# Patient Record
Sex: FEMALE | Race: WHITE | Employment: OTHER | URBAN - METROPOLITAN AREA
[De-identification: names, ages, dates, MRNs, and addresses within clinical notes are randomized per-mention and may not be internally consistent; named-entity substitution may affect disease eponyms.]

---

## 2017-01-04 ENCOUNTER — TELEPHONE (OUTPATIENT)
Dept: SURGERY | Age: 72
End: 2017-01-04

## 2017-01-04 NOTE — TELEPHONE ENCOUNTER
I called patient and told her, per Dr. Maynor Boyd, since Dr. Maynor Boyd had never seen her she would need an office appointment. I offered her tomorrow or Friday (as a push appointment) but neither of those days worked for her schedule. I gave her our number, and she said she would call back to make an appt. on Monday or Wed. Dr. Riccardo Tony normal office hours.  She states she is having arm problems and needs to get that straight first.

## 2017-01-04 NOTE — TELEPHONE ENCOUNTER
----- Message from Clinton Montenegro MD sent at 1/3/2017  4:19 PM EST -----  Regarding: FW: Unusual request by patient  Let's call her Wednesday and see what we can work out. Please remind me  Cassandra Carroll  ----- Message -----     From: Ugo Shelton MD     Sent: 1/2/2017   6:22 PM       To: Clinton Montenegro MD  Subject: Unusual request by patient                           Ender Acosta Dr. Rick Viveros,    This patient of mine needs right thyroid lobectomy and  she wants to meet with you directly on the day of surgery. She says she is okay without having to meet you/surgeon prior  She is cleared by pulmonary as she has pulmonary problems     Do you want to call her or have your nurse call her reg a consultation prior to surgery.       Dr. Peola Phoenix

## 2017-07-18 ENCOUNTER — OFFICE VISIT (OUTPATIENT)
Dept: SURGERY | Age: 72
End: 2017-07-18

## 2017-07-18 VITALS
HEART RATE: 81 BPM | HEIGHT: 67 IN | RESPIRATION RATE: 18 BRPM | BODY MASS INDEX: 21.5 KG/M2 | TEMPERATURE: 98.2 F | WEIGHT: 137 LBS | SYSTOLIC BLOOD PRESSURE: 126 MMHG | OXYGEN SATURATION: 93 % | DIASTOLIC BLOOD PRESSURE: 88 MMHG

## 2017-07-18 DIAGNOSIS — E04.2 MULTINODULAR GOITER: Primary | ICD-10-CM

## 2017-07-18 NOTE — MR AVS SNAPSHOT
Visit Information Date & Time Provider Department Dept. Phone Encounter #  
 7/18/2017  1:00 PM Tuan Hamilton MD [phone] [id number] Your Appointments 8/21/2017  9:15 AM  
POST OP 10 MIN with JOSEPH Hargrove  
[facility] 22 857 ([address]) Appt Note: 8/4/17DC(EB assist)Right Thyroid Lobectomy, Possible Total.po  
 [address] [facility] [address]  
[address] [place] [address] Upcoming Health Maintenance Date Due Hepatitis C Screening 1945 DTaP/Tdap/Td series (1 - Tdap) 5/27/1967 FOBT Q 1 YEAR AGE 50-75 5/27/1996 ZOSTER VACCINE AGE 60> 5/27/2006 GLAUCOMA SCREENING Q2Y 5/27/2011 MEDICARE YEARLY EXAM 5/27/2011 INFLUENZA AGE 9 TO ADULT 8/1/2017 Pneumococcal 65+ Low/Medium Risk (2 of 2 - PPSV23) 9/16/2017 BREAST CANCER SCRN MAMMOGRAM 7/29/2018 Allergies as of 7/18/2017  Review Complete On: 7/18/2017 By: Tuan Hamilton MD  
  
 Severity Noted Reaction Type Reactions Aspirin  07/18/2017    Nausea Only Ciprofloxacin  07/18/2017    Nausea and Vomiting Codeine  10/03/2016    Itching Ibuprofen  07/18/2017    Nausea and Vomiting Levaquin [Levofloxacin]  07/18/2017    Nausea and Vomiting Macrobid [Nitrofurantoin Monohyd/m-cryst]  07/18/2017    Nausea and Vomiting Morphine  10/03/2016    Nausea and Vomiting Current Immunizations  Never Reviewed No immunizations on file. Not reviewed this visit Vitals BP Pulse Temp Resp Height(growth percentile) Weight(growth percentile) 126/88 81 98.2 °F (36.8 °C) (Oral) 18 5' 7\" (1.702 m) 137 lb (62.1 kg) SpO2 BMI OB Status Smoking Status 93% 21.46 kg/m2 Postmenopausal Former Smoker Vitals History BMI and BSA Data Body Mass Index Body Surface Area  
 21.46 kg/m 2 1.71 m 2 Preferred Pharmacy Pharmacy Name Phone [facility] [address], [address] [facility] [place] AT [address] [phone] Your Updated Medication List  
  
   
This list is accurate as of: 7/18/17  4:42 PM.  Always use your most recent med list.  
  
  
  
  
 albuterol-ipratropium 2.5 mg-0.5 mg/3 ml Nebu Commonly known as:  Mario Cast INHALE THE CONTENTS OF 1 VIAL VIA NEB QID PRN  
  
 ALPRAZolam 0.25 mg tablet Commonly known as:  Darus Edu Take 1 Tab by mouth as needed. ASMANEX TWISTHALER 220 mcg (60 doses) inhaler Generic drug:  mometasone INL 1 PUFF PO BID  
  
 ATROVENT HFA 17 mcg/actuation inhaler Generic drug:  ipratropium INL 2 PFS PO Q 4 H  
  
 azithromycin 250 mg tablet Commonly known as:  Berniece Seat Take 1 Tab by mouth daily. DULERA 200-5 mcg/actuation HFA inhaler Generic drug:  mometasone-formoterol INL 2 PFS PO BID  
  
 ergocalciferol 50,000 unit capsule Commonly known as:  ERGOCALCIFEROL Take 1 Cap by mouth every seven (7) days. fluticasone 50 mcg/actuation nasal spray Commonly known as:  Keila Ang SHAKE WELL AND INSTILL 2 SPRAYS IEN D  
  
 FORADIL AEROLIZER 12 mcg capsule for inhaler Generic drug:  formoterol INHALE THE CONTENTS OF 1 CAPSULE USING DEVICE Q 12 H  
  
 furosemide 40 mg tablet Commonly known as:  LASIX Take 1 Tab by mouth as needed. gabapentin 100 mg capsule Commonly known as:  NEURONTIN Take 1 Cap by mouth three (3) times daily as needed. Insulin Needles (Disposable) 31 gauge x 3/16\" Ndle Commonly known as:  BD INSULIN PEN NEEDLE UF MINI Use once daily. Dx code M81.0  
  
 levothyroxine 25 mcg tablet Commonly known as:  SYNTHROID Take 1 Tab by mouth Daily (before breakfast). montelukast 10 mg tablet Commonly known as:  SINGULAIR TK 1 T PO BED  
  
 nystatin 100,000 unit/mL suspension Commonly known as:  MYCOSTATIN  
SHAKE WELL AND TK 5 ML PO QID FOR 7 DAYS predniSONE 20 mg tablet Commonly known as:  DELTASONE  
TK 1 T PO D FOR 7 DAYS  FILL PRN  
  
 teriparatide 20 mcg/dose - 600 mcg/2.4 mL Pnij injection Commonly known as:  FORTEO  
0.08 mL by SubCUTAneous route daily. * VENTOLIN HFA 90 mcg/actuation inhaler Generic drug:  albuterol INHALE 2 PUFFS Q 4 H PRN FOR WHEEZING  
  
 * albuterol 2.5 mg /3 mL (0.083 %) nebulizer solution Commonly known as:  PROVENTIL VENTOLIN  
by Nebulization route every four (4) hours as needed. * Notice: This list has 2 medication(s) that are the same as other medications prescribed for you. Read the directions carefully, and ask your doctor or other care provider to review them with you. To-Do List   
 07/28/2017 10:30 AM  
  Appointment with [address] [place] [address] PAT EXAM RM 2 at [address] [place] [address] ([phone]) Introducing \[facility]\"" & [facility] SERVICES! [facility] introduces Image Socket patient portal. Now you can access parts of your medical record, email your doctor's office, and request medication refills online. 1. In your internet browser, go to [email or web address] 2. Click on the First Time User? Click Here link in the Sign In box. You will see the New Member Sign Up page. 3. Enter your Frontier T[employer] Access Code exactly as it appears below. You will not need to use this code after youve completed the sign-up process. If you do not sign up before the expiration date, you must request a new code. · Image Socket Access Code: [email or web address] Expires: 10/16/2017  4:41 PM 
 
4. Enter the last four digits of your Social Security Number (xxxx) and Date of Birth (mm/dd/yyyy) as indicated and click Submit. You will be taken to the next sign-up page. 5. Create a Frontier Toxicologyt ID. This will be your Frontier T[employer] login ID and cannot be changed, so think of one that is secure and easy to remember. 6. Create a [employer] T[employer]t password. You can change your password at any time. 7. Enter your Password Reset Question and Answer. This can be used at a later time if you forget your password. 8. Enter your e-mail address. You will receive e-mail notification when new information is available in [address] 9. Click Sign Up. You can now view and download portions of your medical record. 10. Click the Download Summary menu link to download a portable copy of your medical information. If you have questions, please visit the Frequently Asked Questions section of the Sogou website. Remember, Sogou is NOT to be used for urgent needs. For medical emergencies, dial 911. Now available from your iPhone and Android! Please provide this summary of care documentation to your next provider. Your primary care clinician is listed as Cheikh Bhatti. If you have any questions after today's visit, please call [phone].

## 2017-07-18 NOTE — PROGRESS NOTES
Chief Complaint   Patient presents with    Thyroid Problem     nodules on thyroids, evaul for possible surg. 1. Have you been to the ER, urgent care clinic since your last visit? Hospitalized since your last visit? no    2. Have you seen or consulted any other health care providers outside of the [address] [place] [address] since your last visit? Include any pap smears or colon screening.  Yes PCP Johnny Millan pulmonologist

## 2017-07-18 NOTE — PROGRESS NOTES
HISTORY OF PRESENT ILLNESS  Dani Allen is a 71 y.o. female who is referred by Dr. Cooper Olivares for right thyroid lobectomy. HPI Comments: Ms. Roderick Venegas tells me that she was found to have a nodule in the right lobe of her thyroid in 8/2016. The nodule measured 2.4cm x 1.6cm x 1.7cm on ultrasound. (By report. Films not available.) Subsequent FNA was non diagnostic. Since then, she has been experiencing increased dysphagia and odynophagia. She has otherwise been in her usual state of health. Past Medical History:  No date: Back pain  No date: COPD (chronic obstructive pulmonary disease) (*  No date: Diverticulitis  No date: Dizziness  No date: Gastritis  No date: Hepatitis B  No date: Hernia  7/18/2017: Multinodular goiter  No date: Rheumatic fever    Past Surgical History:  No date: HX APPENDECTOMY  No date: HX CHOLECYSTECTOMY  No date: HX HYSTERECTOMY  No date: HX TONSIL AND ADENOIDECTOMY    Review of patient's family history indicates:    Heart Disease                  Mother                    COPD                           Sister                    Social History: Employment - Retired. Tobacco - Denies. EtOH - Denies. Review of systems negative except as noted. Review of Systems   HENT: Positive for ear pain. Dysphagia. Odynophagia. Voice is \"raspy. \"   Eyes: Positive for blurred vision. Respiratory: Positive for cough. Gastrointestinal: Positive for diarrhea. Musculoskeletal: Positive for back pain and joint pain. Joint swelling. Stiff joints. Neurological: Positive for dizziness and focal weakness. Physical Exam   Constitutional: She appears well-developed and well-nourished. No distress. HENT:   Head: Normocephalic and atraumatic. Eyes: No scleral icterus. Neck: Neck supple. Thyromegaly present. Cardiovascular: Normal rate and regular rhythm. Pulmonary/Chest: Effort normal and breath sounds normal.   Abdominal: Soft. She exhibits no distension.  There is no tenderness. There is no rebound and no guarding. Musculoskeletal: Normal range of motion. Lymphadenopathy:     She has no cervical adenopathy. Neurological: She is alert. Vitals reviewed. ASSESSMENT and PLAN  In view of the findings on H and P and ultrasound, Ms. Nayeli Leija should benefit from right thyroid lobectomy. Discussed procedure with her including risks of bleeding, infection, hypoparathyroidism and recurrent laryngeal nerve injury. Will send right thyroid lobectomy for frozen section evaluation. If malignant, then will plan on completion thyroidectomy with attendant risks of recurrent laryngeal nerve injury and hypoparathyroidism. Also explained to Ms. Nayeli Leija that it is possible that the frozen section is negative but that permanent sections will be positive for malignancy and that she will require completion thyroidectomy. She understands and wishes to proceed. I have tentatively scheduled Ms. Nayeli Leija for surgery on August 4, 2017 at [facility] and will see her back in the office postoperatively. She is agreeable to this plan of action and is most certainly free to contact the office should any questions or concerns arise.        CC: MD Iona Medel MD

## 2017-07-21 RX ORDER — BUPIVACAINE HYDROCHLORIDE 2.5 MG/ML
30 INJECTION, SOLUTION EPIDURAL; INFILTRATION; INTRACAUDAL ONCE
Status: CANCELLED | OUTPATIENT
Start: 2017-07-21 | End: 2017-07-21

## 2017-07-28 ENCOUNTER — HOSPITAL ENCOUNTER (OUTPATIENT)
Dept: PREADMISSION TESTING | Age: 72
Discharge: HOME | End: 2017-07-28
Payer: MEDICARE

## 2017-07-28 VITALS
HEIGHT: 65 IN | WEIGHT: 132 LBS | SYSTOLIC BLOOD PRESSURE: 128 MMHG | DIASTOLIC BLOOD PRESSURE: 80 MMHG | TEMPERATURE: 98.3 F | BODY MASS INDEX: 21.99 KG/M2 | HEART RATE: 80 BPM

## 2017-07-28 LAB
ANION GAP BLD CALC-SCNC: 8 MMOL/L (ref 5–15)
ATRIAL RATE: 66 BPM
BASOPHILS # BLD AUTO: 0.1 K/UL (ref 0–0.1)
BASOPHILS # BLD: 1 % (ref 0–1)
BUN SERPL-MCNC: 15 MG/DL (ref 6–20)
BUN/CREAT SERPL: 26 (ref 12–20)
CALCIUM SERPL-MCNC: 9 MG/DL (ref 8.5–10.1)
CALCULATED P AXIS, ECG09: 77 DEGREES
CALCULATED R AXIS, ECG10: 40 DEGREES
CALCULATED T AXIS, ECG11: 49 DEGREES
CHLORIDE SERPL-SCNC: 106 MMOL/L (ref 97–108)
CO2 SERPL-SCNC: 28 MMOL/L (ref 21–32)
CREAT SERPL-MCNC: 0.58 MG/DL (ref 0.55–1.02)
DIAGNOSIS, 93000: NORMAL
EOSINOPHIL # BLD: 0.3 K/UL (ref 0–0.4)
EOSINOPHIL NFR BLD: 3 % (ref 0–7)
ERYTHROCYTE [DISTWIDTH] IN BLOOD BY AUTOMATED COUNT: 13.2 % (ref 11.5–14.5)
GLUCOSE SERPL-MCNC: 75 MG/DL (ref 65–100)
HCT VFR BLD AUTO: 38.3 % (ref 35–47)
HGB BLD-MCNC: 12.3 G/DL (ref 11.5–16)
LYMPHOCYTES # BLD AUTO: 33 % (ref 12–49)
LYMPHOCYTES # BLD: 2.4 K/UL (ref 0.8–3.5)
MCH RBC QN AUTO: 29.1 PG (ref 26–34)
MCHC RBC AUTO-ENTMCNC: 32.1 G/DL (ref 30–36.5)
MCV RBC AUTO: 90.5 FL (ref 80–99)
MONOCYTES # BLD: 0.6 K/UL (ref 0–1)
MONOCYTES NFR BLD AUTO: 8 % (ref 5–13)
NEUTS SEG # BLD: 4.1 K/UL (ref 1.8–8)
NEUTS SEG NFR BLD AUTO: 55 % (ref 32–75)
P-R INTERVAL, ECG05: 144 MS
PLATELET # BLD AUTO: 234 K/UL (ref 150–400)
POTASSIUM SERPL-SCNC: 3.8 MMOL/L (ref 3.5–5.1)
Q-T INTERVAL, ECG07: 384 MS
QRS DURATION, ECG06: 82 MS
QTC CALCULATION (BEZET), ECG08: 402 MS
RBC # BLD AUTO: 4.23 M/UL (ref 3.8–5.2)
SODIUM SERPL-SCNC: 142 MMOL/L (ref 136–145)
VENTRICULAR RATE, ECG03: 66 BPM
WBC # BLD AUTO: 7.4 K/UL (ref 3.6–11)

## 2017-07-28 PROCEDURE — 85025 COMPLETE CBC W/AUTO DIFF WBC: CPT | Performed by: SURGERY

## 2017-07-28 PROCEDURE — 80048 BASIC METABOLIC PNL TOTAL CA: CPT | Performed by: SURGERY

## 2017-07-28 PROCEDURE — 93005 ELECTROCARDIOGRAM TRACING: CPT

## 2017-07-28 PROCEDURE — 36415 COLL VENOUS BLD VENIPUNCTURE: CPT | Performed by: SURGERY

## 2017-07-28 NOTE — PERIOP NOTES
PREOPERATIVE INSTRUCTIONS REVIEWED WITH PATIENT. PATIENT GIVEN TWO-- SIX PACKS OF CHG WIPES. INSTRUCTIONS REVIEWED ON USE OF CHG WIPES. PATIENT GIVEN SSI INFECTIONS SHEET. PATIENT WAS GIVEN THE OPPORTUNITY TO ASK QUESTIONS ON THE INFORMATION PROVIDED.

## 2017-07-31 NOTE — PERIOP NOTES
NOTE SENT TO FEDE BAILEY--DR GUERRERO RE: FAXED CXR REPORT AND OFFICE NOTES FROM PULM. DR AMEYA MUNOZ / [facility].

## 2017-08-03 ENCOUNTER — ANESTHESIA EVENT (OUTPATIENT)
Dept: SURGERY | Age: 72
End: 2017-08-03
Payer: MEDICARE

## 2017-08-04 ENCOUNTER — ANESTHESIA (OUTPATIENT)
Dept: SURGERY | Age: 72
End: 2017-08-04
Payer: MEDICARE

## 2017-08-04 ENCOUNTER — HOSPITAL ENCOUNTER (OUTPATIENT)
Age: 72
Setting detail: OBSERVATION
Discharge: HOME | End: 2017-08-05
Attending: SURGERY | Admitting: SURGERY
Payer: MEDICARE

## 2017-08-04 PROBLEM — E04.1 THYROID NODULE: Status: ACTIVE | Noted: 2017-08-04

## 2017-08-04 PROCEDURE — 74011250636 HC RX REV CODE- 250/636: Performed by: SURGERY

## 2017-08-04 PROCEDURE — 74011250637 HC RX REV CODE- 250/637: Performed by: SURGERY

## 2017-08-04 PROCEDURE — 77030008684 HC TU ET CUF COVD -B: Performed by: ANESTHESIOLOGY

## 2017-08-04 PROCEDURE — 94640 AIRWAY INHALATION TREATMENT: CPT

## 2017-08-04 PROCEDURE — 74011250636 HC RX REV CODE- 250/636: Performed by: ANESTHESIOLOGY

## 2017-08-04 PROCEDURE — 77030020782 HC GWN BAIR PAWS FLX 3M -B

## 2017-08-04 PROCEDURE — 77030026438 HC STYL ET INTUB CARD -A: Performed by: ANESTHESIOLOGY

## 2017-08-04 PROCEDURE — 77030018846 HC SOL IRR STRL H20 ICUM -A: Performed by: SURGERY

## 2017-08-04 PROCEDURE — 99218 HC RM OBSERVATION: CPT

## 2017-08-04 PROCEDURE — 77030010507 HC ADH SKN DERMBND J&J -B: Performed by: SURGERY

## 2017-08-04 PROCEDURE — 77030019908 HC STETH ESOPH SIMS -A: Performed by: ANESTHESIOLOGY

## 2017-08-04 PROCEDURE — 77030013079 HC BLNKT BAIR HGGR 3M -A: Performed by: ANESTHESIOLOGY

## 2017-08-04 PROCEDURE — 77030011640 HC PAD GRND REM COVD -A: Performed by: SURGERY

## 2017-08-04 PROCEDURE — 76010000131 HC OR TIME 2 TO 2.5 HR: Performed by: SURGERY

## 2017-08-04 PROCEDURE — 77030011267 HC ELECTRD BLD COVD -A: Performed by: SURGERY

## 2017-08-04 PROCEDURE — 74011000250 HC RX REV CODE- 250: Performed by: SURGERY

## 2017-08-04 PROCEDURE — 77030034626 HC LIGASURE SM JAW SEAL OPN SURG COVD -E: Performed by: SURGERY

## 2017-08-04 PROCEDURE — 74011250636 HC RX REV CODE- 250/636

## 2017-08-04 PROCEDURE — 88331 PATH CONSLTJ SURG 1 BLK 1SPC: CPT | Performed by: SURGERY

## 2017-08-04 PROCEDURE — 77030031139 HC SUT VCRL2 J&J -A: Performed by: SURGERY

## 2017-08-04 PROCEDURE — 74011000250 HC RX REV CODE- 250

## 2017-08-04 PROCEDURE — 88307 TISSUE EXAM BY PATHOLOGIST: CPT | Performed by: SURGERY

## 2017-08-04 PROCEDURE — 77030032490 HC SLV COMPR SCD KNE COVD -B: Performed by: SURGERY

## 2017-08-04 PROCEDURE — 77030010514 HC APPL CLP LIG COVD -B: Performed by: SURGERY

## 2017-08-04 PROCEDURE — 77030028700 HC BLD TISS PLSM MEDT -E: Performed by: SURGERY

## 2017-08-04 PROCEDURE — 77030002933 HC SUT MCRYL J&J -A: Performed by: SURGERY

## 2017-08-04 PROCEDURE — 76210000016 HC OR PH I REC 1 TO 1.5 HR: Performed by: SURGERY

## 2017-08-04 PROCEDURE — 77030002996 HC SUT SLK J&J -A: Performed by: SURGERY

## 2017-08-04 PROCEDURE — 76060000035 HC ANESTHESIA 2 TO 2.5 HR: Performed by: SURGERY

## 2017-08-04 PROCEDURE — 77030018836 HC SOL IRR NACL ICUM -A: Performed by: SURGERY

## 2017-08-04 RX ORDER — SODIUM CHLORIDE 9 MG/ML
25 INJECTION, SOLUTION INTRAVENOUS CONTINUOUS
Status: DISCONTINUED | OUTPATIENT
Start: 2017-08-04 | End: 2017-08-04 | Stop reason: HOSPADM

## 2017-08-04 RX ORDER — ENOXAPARIN SODIUM 100 MG/ML
40 INJECTION SUBCUTANEOUS EVERY 24 HOURS
Status: DISCONTINUED | OUTPATIENT
Start: 2017-08-04 | End: 2017-08-05 | Stop reason: HOSPADM

## 2017-08-04 RX ORDER — IPRATROPIUM BROMIDE AND ALBUTEROL SULFATE 2.5; .5 MG/3ML; MG/3ML
3 SOLUTION RESPIRATORY (INHALATION)
Status: DISCONTINUED | OUTPATIENT
Start: 2017-08-04 | End: 2017-08-05 | Stop reason: HOSPADM

## 2017-08-04 RX ORDER — ALPRAZOLAM 0.25 MG/1
0.25 TABLET ORAL
Status: DISCONTINUED | OUTPATIENT
Start: 2017-08-04 | End: 2017-08-05 | Stop reason: HOSPADM

## 2017-08-04 RX ORDER — PROCHLORPERAZINE EDISYLATE 5 MG/ML
5 INJECTION INTRAMUSCULAR; INTRAVENOUS
Status: DISCONTINUED | OUTPATIENT
Start: 2017-08-04 | End: 2017-08-04 | Stop reason: SDUPTHER

## 2017-08-04 RX ORDER — AZITHROMYCIN 250 MG/1
250 TABLET, FILM COATED ORAL DAILY
Status: DISCONTINUED | OUTPATIENT
Start: 2017-08-05 | End: 2017-08-05 | Stop reason: HOSPADM

## 2017-08-04 RX ORDER — SODIUM CHLORIDE, SODIUM LACTATE, POTASSIUM CHLORIDE, CALCIUM CHLORIDE 600; 310; 30; 20 MG/100ML; MG/100ML; MG/100ML; MG/100ML
50 INJECTION, SOLUTION INTRAVENOUS CONTINUOUS
Status: DISCONTINUED | OUTPATIENT
Start: 2017-08-04 | End: 2017-08-04

## 2017-08-04 RX ORDER — ONDANSETRON 2 MG/ML
4 INJECTION INTRAMUSCULAR; INTRAVENOUS
Status: DISCONTINUED | OUTPATIENT
Start: 2017-08-04 | End: 2017-08-05 | Stop reason: HOSPADM

## 2017-08-04 RX ORDER — BUDESONIDE 0.5 MG/2ML
500 INHALANT ORAL
Status: DISCONTINUED | OUTPATIENT
Start: 2017-08-04 | End: 2017-08-05 | Stop reason: HOSPADM

## 2017-08-04 RX ORDER — FENTANYL CITRATE 50 UG/ML
25 INJECTION, SOLUTION INTRAMUSCULAR; INTRAVENOUS
Status: COMPLETED | OUTPATIENT
Start: 2017-08-04 | End: 2017-08-04

## 2017-08-04 RX ORDER — MONTELUKAST SODIUM 10 MG/1
10 TABLET ORAL
Status: DISCONTINUED | OUTPATIENT
Start: 2017-08-04 | End: 2017-08-05 | Stop reason: HOSPADM

## 2017-08-04 RX ORDER — DIPHENHYDRAMINE HYDROCHLORIDE 50 MG/ML
12.5 INJECTION, SOLUTION INTRAMUSCULAR; INTRAVENOUS AS NEEDED
Status: DISCONTINUED | OUTPATIENT
Start: 2017-08-04 | End: 2017-08-04 | Stop reason: HOSPADM

## 2017-08-04 RX ORDER — HYDROMORPHONE HYDROCHLORIDE 1 MG/ML
0.2 INJECTION, SOLUTION INTRAMUSCULAR; INTRAVENOUS; SUBCUTANEOUS
Status: DISCONTINUED | OUTPATIENT
Start: 2017-08-04 | End: 2017-08-04 | Stop reason: HOSPADM

## 2017-08-04 RX ORDER — SODIUM CHLORIDE 0.9 % (FLUSH) 0.9 %
5-10 SYRINGE (ML) INJECTION AS NEEDED
Status: DISCONTINUED | OUTPATIENT
Start: 2017-08-04 | End: 2017-08-04 | Stop reason: HOSPADM

## 2017-08-04 RX ORDER — IPRATROPIUM BROMIDE 0.5 MG/2.5ML
500 SOLUTION RESPIRATORY (INHALATION)
Status: DISCONTINUED | OUTPATIENT
Start: 2017-08-04 | End: 2017-08-05 | Stop reason: HOSPADM

## 2017-08-04 RX ORDER — MIDAZOLAM HYDROCHLORIDE 1 MG/ML
1 INJECTION, SOLUTION INTRAMUSCULAR; INTRAVENOUS AS NEEDED
Status: DISCONTINUED | OUTPATIENT
Start: 2017-08-04 | End: 2017-08-04 | Stop reason: HOSPADM

## 2017-08-04 RX ORDER — ALBUTEROL SULFATE 0.83 MG/ML
2.5 SOLUTION RESPIRATORY (INHALATION)
Status: DISCONTINUED | OUTPATIENT
Start: 2017-08-04 | End: 2017-08-05 | Stop reason: HOSPADM

## 2017-08-04 RX ORDER — MIDAZOLAM HYDROCHLORIDE 1 MG/ML
INJECTION, SOLUTION INTRAMUSCULAR; INTRAVENOUS AS NEEDED
Status: DISCONTINUED | OUTPATIENT
Start: 2017-08-04 | End: 2017-08-04 | Stop reason: HOSPADM

## 2017-08-04 RX ORDER — ONDANSETRON 2 MG/ML
4 INJECTION INTRAMUSCULAR; INTRAVENOUS AS NEEDED
Status: DISCONTINUED | OUTPATIENT
Start: 2017-08-04 | End: 2017-08-04 | Stop reason: HOSPADM

## 2017-08-04 RX ORDER — SUCCINYLCHOLINE CHLORIDE 20 MG/ML
INJECTION INTRAMUSCULAR; INTRAVENOUS AS NEEDED
Status: DISCONTINUED | OUTPATIENT
Start: 2017-08-04 | End: 2017-08-04 | Stop reason: HOSPADM

## 2017-08-04 RX ORDER — HYDROMORPHONE HYDROCHLORIDE 1 MG/ML
INJECTION, SOLUTION INTRAMUSCULAR; INTRAVENOUS; SUBCUTANEOUS AS NEEDED
Status: DISCONTINUED | OUTPATIENT
Start: 2017-08-04 | End: 2017-08-04 | Stop reason: HOSPADM

## 2017-08-04 RX ORDER — HYDROMORPHONE HYDROCHLORIDE 1 MG/ML
0.5 INJECTION, SOLUTION INTRAMUSCULAR; INTRAVENOUS; SUBCUTANEOUS
Status: DISCONTINUED | OUTPATIENT
Start: 2017-08-04 | End: 2017-08-05 | Stop reason: HOSPADM

## 2017-08-04 RX ORDER — ONDANSETRON 2 MG/ML
INJECTION INTRAMUSCULAR; INTRAVENOUS AS NEEDED
Status: DISCONTINUED | OUTPATIENT
Start: 2017-08-04 | End: 2017-08-04 | Stop reason: HOSPADM

## 2017-08-04 RX ORDER — OXYCODONE AND ACETAMINOPHEN 5; 325 MG/1; MG/1
1 TABLET ORAL AS NEEDED
Status: DISCONTINUED | OUTPATIENT
Start: 2017-08-04 | End: 2017-08-04 | Stop reason: HOSPADM

## 2017-08-04 RX ORDER — SODIUM CHLORIDE, SODIUM LACTATE, POTASSIUM CHLORIDE, CALCIUM CHLORIDE 600; 310; 30; 20 MG/100ML; MG/100ML; MG/100ML; MG/100ML
125 INJECTION, SOLUTION INTRAVENOUS CONTINUOUS
Status: DISCONTINUED | OUTPATIENT
Start: 2017-08-04 | End: 2017-08-04 | Stop reason: HOSPADM

## 2017-08-04 RX ORDER — SODIUM CHLORIDE 0.9 % (FLUSH) 0.9 %
5-10 SYRINGE (ML) INJECTION EVERY 8 HOURS
Status: DISCONTINUED | OUTPATIENT
Start: 2017-08-04 | End: 2017-08-04 | Stop reason: HOSPADM

## 2017-08-04 RX ORDER — SODIUM CHLORIDE, SODIUM LACTATE, POTASSIUM CHLORIDE, CALCIUM CHLORIDE 600; 310; 30; 20 MG/100ML; MG/100ML; MG/100ML; MG/100ML
INJECTION, SOLUTION INTRAVENOUS
Status: DISCONTINUED | OUTPATIENT
Start: 2017-08-04 | End: 2017-08-04 | Stop reason: HOSPADM

## 2017-08-04 RX ORDER — MIDAZOLAM HYDROCHLORIDE 1 MG/ML
0.5 INJECTION, SOLUTION INTRAMUSCULAR; INTRAVENOUS
Status: DISCONTINUED | OUTPATIENT
Start: 2017-08-04 | End: 2017-08-04 | Stop reason: HOSPADM

## 2017-08-04 RX ORDER — HYDROMORPHONE HYDROCHLORIDE 5 MG/5ML
0.5 SOLUTION ORAL
Status: DISCONTINUED | OUTPATIENT
Start: 2017-08-04 | End: 2017-08-04 | Stop reason: SDUPTHER

## 2017-08-04 RX ORDER — GABAPENTIN 100 MG/1
100 CAPSULE ORAL
Status: DISCONTINUED | OUTPATIENT
Start: 2017-08-04 | End: 2017-08-05 | Stop reason: HOSPADM

## 2017-08-04 RX ORDER — FLUTICASONE PROPIONATE 50 MCG
2 SPRAY, SUSPENSION (ML) NASAL DAILY
Status: DISCONTINUED | OUTPATIENT
Start: 2017-08-05 | End: 2017-08-05 | Stop reason: HOSPADM

## 2017-08-04 RX ORDER — CEFAZOLIN SODIUM IN 0.9 % NACL 2 G/50 ML
2 INTRAVENOUS SOLUTION, PIGGYBACK (ML) INTRAVENOUS
Status: COMPLETED | OUTPATIENT
Start: 2017-08-04 | End: 2017-08-04

## 2017-08-04 RX ORDER — SODIUM CHLORIDE 0.9 % (FLUSH) 0.9 %
5-10 SYRINGE (ML) INJECTION EVERY 8 HOURS
Status: DISCONTINUED | OUTPATIENT
Start: 2017-08-04 | End: 2017-08-05 | Stop reason: HOSPADM

## 2017-08-04 RX ORDER — FUROSEMIDE 40 MG/1
40 TABLET ORAL AS NEEDED
Status: DISCONTINUED | OUTPATIENT
Start: 2017-08-04 | End: 2017-08-05 | Stop reason: HOSPADM

## 2017-08-04 RX ORDER — BUPIVACAINE HYDROCHLORIDE 2.5 MG/ML
30 INJECTION, SOLUTION EPIDURAL; INFILTRATION; INTRACAUDAL ONCE
Status: COMPLETED | OUTPATIENT
Start: 2017-08-04 | End: 2017-08-04

## 2017-08-04 RX ORDER — DIPHENHYDRAMINE HCL 25 MG
25 CAPSULE ORAL
Status: DISCONTINUED | OUTPATIENT
Start: 2017-08-04 | End: 2017-08-05 | Stop reason: HOSPADM

## 2017-08-04 RX ORDER — HYDROCODONE BITARTRATE AND ACETAMINOPHEN 5; 325 MG/1; MG/1
1 TABLET ORAL
Qty: 10 TAB | Refills: 0 | Status: SHIPPED | OUTPATIENT
Start: 2017-08-04 | End: 2019-09-18 | Stop reason: ALTCHOICE

## 2017-08-04 RX ORDER — MORPHINE SULFATE 2 MG/ML
2 INJECTION, SOLUTION INTRAMUSCULAR; INTRAVENOUS
Status: DISCONTINUED | OUTPATIENT
Start: 2017-08-04 | End: 2017-08-04 | Stop reason: ALTCHOICE

## 2017-08-04 RX ORDER — ARFORMOTEROL TARTRATE 15 UG/2ML
15 SOLUTION RESPIRATORY (INHALATION)
Status: DISCONTINUED | OUTPATIENT
Start: 2017-08-04 | End: 2017-08-05 | Stop reason: HOSPADM

## 2017-08-04 RX ORDER — ROCURONIUM BROMIDE 10 MG/ML
INJECTION, SOLUTION INTRAVENOUS AS NEEDED
Status: DISCONTINUED | OUTPATIENT
Start: 2017-08-04 | End: 2017-08-04 | Stop reason: HOSPADM

## 2017-08-04 RX ORDER — BUDESONIDE 0.5 MG/2ML
500 INHALANT ORAL
Status: DISCONTINUED | OUTPATIENT
Start: 2017-08-04 | End: 2017-08-04 | Stop reason: ALTCHOICE

## 2017-08-04 RX ORDER — SODIUM CHLORIDE 0.9 % (FLUSH) 0.9 %
5-10 SYRINGE (ML) INJECTION AS NEEDED
Status: DISCONTINUED | OUTPATIENT
Start: 2017-08-04 | End: 2017-08-05 | Stop reason: HOSPADM

## 2017-08-04 RX ORDER — ACETAMINOPHEN 325 MG/1
650 TABLET ORAL
Status: DISCONTINUED | OUTPATIENT
Start: 2017-08-04 | End: 2017-08-05 | Stop reason: HOSPADM

## 2017-08-04 RX ORDER — NYSTATIN 100000 [USP'U]/ML
500000 SUSPENSION ORAL 4 TIMES DAILY
Status: DISCONTINUED | OUTPATIENT
Start: 2017-08-04 | End: 2017-08-04

## 2017-08-04 RX ORDER — HYDROCODONE BITARTRATE AND ACETAMINOPHEN 5; 325 MG/1; MG/1
1 TABLET ORAL
Status: DISCONTINUED | OUTPATIENT
Start: 2017-08-04 | End: 2017-08-05 | Stop reason: HOSPADM

## 2017-08-04 RX ORDER — FENTANYL CITRATE 50 UG/ML
50 INJECTION, SOLUTION INTRAMUSCULAR; INTRAVENOUS AS NEEDED
Status: DISCONTINUED | OUTPATIENT
Start: 2017-08-04 | End: 2017-08-04 | Stop reason: HOSPADM

## 2017-08-04 RX ORDER — ACETAMINOPHEN 10 MG/ML
INJECTION, SOLUTION INTRAVENOUS AS NEEDED
Status: DISCONTINUED | OUTPATIENT
Start: 2017-08-04 | End: 2017-08-04 | Stop reason: HOSPADM

## 2017-08-04 RX ORDER — LEVOTHYROXINE SODIUM 25 UG/1
25 TABLET ORAL
Status: DISCONTINUED | OUTPATIENT
Start: 2017-08-05 | End: 2017-08-05 | Stop reason: HOSPADM

## 2017-08-04 RX ORDER — ALBUTEROL SULFATE 90 UG/1
2 AEROSOL, METERED RESPIRATORY (INHALATION)
Status: DISCONTINUED | OUTPATIENT
Start: 2017-08-04 | End: 2017-08-04 | Stop reason: ALTCHOICE

## 2017-08-04 RX ORDER — FENTANYL CITRATE 50 UG/ML
INJECTION, SOLUTION INTRAMUSCULAR; INTRAVENOUS AS NEEDED
Status: DISCONTINUED | OUTPATIENT
Start: 2017-08-04 | End: 2017-08-04 | Stop reason: HOSPADM

## 2017-08-04 RX ORDER — LIDOCAINE HYDROCHLORIDE 10 MG/ML
0.1 INJECTION, SOLUTION EPIDURAL; INFILTRATION; INTRACAUDAL; PERINEURAL AS NEEDED
Status: DISCONTINUED | OUTPATIENT
Start: 2017-08-04 | End: 2017-08-04 | Stop reason: HOSPADM

## 2017-08-04 RX ORDER — DEXAMETHASONE SODIUM PHOSPHATE 4 MG/ML
INJECTION, SOLUTION INTRA-ARTICULAR; INTRALESIONAL; INTRAMUSCULAR; INTRAVENOUS; SOFT TISSUE AS NEEDED
Status: DISCONTINUED | OUTPATIENT
Start: 2017-08-04 | End: 2017-08-04 | Stop reason: HOSPADM

## 2017-08-04 RX ADMIN — Medication 10 ML: at 22:00

## 2017-08-04 RX ADMIN — MONTELUKAST SODIUM 10 MG: 10 TABLET, FILM COATED ORAL at 21:32

## 2017-08-04 RX ADMIN — ONDANSETRON 4 MG: 2 INJECTION INTRAMUSCULAR; INTRAVENOUS at 08:21

## 2017-08-04 RX ADMIN — DEXAMETHASONE SODIUM PHOSPHATE 10 MG: 4 INJECTION, SOLUTION INTRA-ARTICULAR; INTRALESIONAL; INTRAMUSCULAR; INTRAVENOUS; SOFT TISSUE at 08:21

## 2017-08-04 RX ADMIN — IPRATROPIUM BROMIDE AND ALBUTEROL SULFATE 3 ML: .5; 3 SOLUTION RESPIRATORY (INHALATION) at 15:50

## 2017-08-04 RX ADMIN — SODIUM CHLORIDE, SODIUM LACTATE, POTASSIUM CHLORIDE, CALCIUM CHLORIDE: 600; 310; 30; 20 INJECTION, SOLUTION INTRAVENOUS at 07:59

## 2017-08-04 RX ADMIN — FENTANYL CITRATE 25 MCG: 50 INJECTION, SOLUTION INTRAMUSCULAR; INTRAVENOUS at 11:00

## 2017-08-04 RX ADMIN — DIPHENHYDRAMINE HYDROCHLORIDE 12.5 MG: 50 INJECTION, SOLUTION INTRAMUSCULAR; INTRAVENOUS at 10:46

## 2017-08-04 RX ADMIN — HYDROMORPHONE HYDROCHLORIDE 0.2 MG: 1 INJECTION, SOLUTION INTRAMUSCULAR; INTRAVENOUS; SUBCUTANEOUS at 10:25

## 2017-08-04 RX ADMIN — HYDROMORPHONE HYDROCHLORIDE 0.2 MG: 1 INJECTION, SOLUTION INTRAMUSCULAR; INTRAVENOUS; SUBCUTANEOUS at 08:53

## 2017-08-04 RX ADMIN — FENTANYL CITRATE 50 MCG: 50 INJECTION, SOLUTION INTRAMUSCULAR; INTRAVENOUS at 08:31

## 2017-08-04 RX ADMIN — FENTANYL CITRATE 50 MCG: 50 INJECTION, SOLUTION INTRAMUSCULAR; INTRAVENOUS at 10:25

## 2017-08-04 RX ADMIN — SODIUM CHLORIDE, SODIUM LACTATE, POTASSIUM CHLORIDE, AND CALCIUM CHLORIDE 125 ML/HR: 600; 310; 30; 20 INJECTION, SOLUTION INTRAVENOUS at 06:54

## 2017-08-04 RX ADMIN — FENTANYL CITRATE 25 MCG: 50 INJECTION, SOLUTION INTRAMUSCULAR; INTRAVENOUS at 10:40

## 2017-08-04 RX ADMIN — MIDAZOLAM HYDROCHLORIDE 2 MG: 1 INJECTION, SOLUTION INTRAMUSCULAR; INTRAVENOUS at 07:45

## 2017-08-04 RX ADMIN — BUDESONIDE 500 MCG: 0.5 INHALANT RESPIRATORY (INHALATION) at 20:38

## 2017-08-04 RX ADMIN — FENTANYL CITRATE 25 MCG: 50 INJECTION, SOLUTION INTRAMUSCULAR; INTRAVENOUS at 10:50

## 2017-08-04 RX ADMIN — FENTANYL CITRATE 25 MCG: 50 INJECTION, SOLUTION INTRAMUSCULAR; INTRAVENOUS at 10:35

## 2017-08-04 RX ADMIN — ROCURONIUM BROMIDE 10 MG: 10 INJECTION, SOLUTION INTRAVENOUS at 08:03

## 2017-08-04 RX ADMIN — HYDROMORPHONE HYDROCHLORIDE 0.5 MG: 1 INJECTION, SOLUTION INTRAMUSCULAR; INTRAVENOUS; SUBCUTANEOUS at 21:40

## 2017-08-04 RX ADMIN — ACETAMINOPHEN 1000 MG: 10 INJECTION, SOLUTION INTRAVENOUS at 08:27

## 2017-08-04 RX ADMIN — SUCCINYLCHOLINE CHLORIDE 100 MG: 20 INJECTION INTRAMUSCULAR; INTRAVENOUS at 08:03

## 2017-08-04 RX ADMIN — CEFAZOLIN 2 G: 1 INJECTION, POWDER, FOR SOLUTION INTRAMUSCULAR; INTRAVENOUS; PARENTERAL at 08:14

## 2017-08-04 RX ADMIN — IPRATROPIUM BROMIDE AND ALBUTEROL SULFATE 3 ML: .5; 3 SOLUTION RESPIRATORY (INHALATION) at 20:38

## 2017-08-04 RX ADMIN — HYDROMORPHONE HYDROCHLORIDE 0.5 MG: 1 INJECTION, SOLUTION INTRAMUSCULAR; INTRAVENOUS; SUBCUTANEOUS at 14:56

## 2017-08-04 RX ADMIN — SODIUM CHLORIDE, SODIUM LACTATE, POTASSIUM CHLORIDE, CALCIUM CHLORIDE: 600; 310; 30; 20 INJECTION, SOLUTION INTRAVENOUS at 07:54

## 2017-08-04 RX ADMIN — SODIUM CHLORIDE, SODIUM LACTATE, POTASSIUM CHLORIDE, AND CALCIUM CHLORIDE 50 ML/HR: 600; 310; 30; 20 INJECTION, SOLUTION INTRAVENOUS at 11:04

## 2017-08-04 RX ADMIN — FENTANYL CITRATE 100 MCG: 50 INJECTION, SOLUTION INTRAMUSCULAR; INTRAVENOUS at 08:03

## 2017-08-04 NOTE — IP AVS SNAPSHOT
[address] [facility] [place] 
[phone] Patient: Rupinder Cantu MRN: [id number] ERA:7/12/0273 You are allergic to the following Allergen Reactions Aspirin Nausea Only Ciprofloxacin Nausea and Vomiting Codeine Itching Ibuprofen Nausea and Vomiting Levaquin (Levofloxacin) Nausea and Vomiting Macrobid (Nitrofurantoin Monohyd/M-Cryst) Nausea and Vomiting Morphine Nausea and Vomiting Recent Documentation Height Weight BMI OB Status Smoking Status 1.651 m 59.9 kg 21.97 kg/m2 Hysterectomy Former Smoker Emergency Contacts Name Discharge Info Relation Home Work Mobile Pamella Cai DISCHARGE CAREGIVER [3] Other Relative [6] [phone] About your hospitalization You were admitted on:  August 4, 2017 You last received care in the:  [address] You were discharged on:  August 5, 2017 Unit phone number:  [phone] Why you were hospitalized Your primary diagnosis was:  Not on File Your diagnoses also included:  Thyroid Nodule Providers Seen During Your Hospitalizations Provider Role Specialty Primary office phone Erick Infante MD Attending Provider General Surgery [phone] Your Primary Care Physician (PCP) Primary Care Physician Office Phone Office Fax [facility] [phone] Follow-up Information Follow up With Details Comments Contact Info Hortencia Hartman MD   [address] [place] [address] 
[phone] Erick Infante MD On 8/21/2017 with Dr. Bola RUIZ, Khushi Patino 9:15 in [place] [id number] [facility] [address] [facility] 
[phone] Your Appointments Monday August 21, 2017  9:15 AM EDT  
POST OP 10 MIN with JOSEPH Hayward  
[facility] 22 6[address] ([address]) [address] [place] [address] [address]  
[phone] Current Discharge Medication List  
  
START taking these medications Dose & Instructions Dispensing Information Comments Morning Noon Evening Bedtime HYDROcodone-acetaminophen 5-325 mg per tablet Commonly known as:  Lawanda Roper Your last dose was: Your next dose is:    
   
   
 Dose:  1 Tab Take 1 Tab by mouth every four (4) hours as needed for Pain. Max Daily Amount: 6 Tabs. Quantity:  10 Tab Refills:  0 CONTINUE these medications which have NOT CHANGED Dose & Instructions Dispensing Information Comments Morning Noon Evening Bedtime  
 albuterol-ipratropium 2.5 mg-0.5 mg/3 ml Nebu Commonly known as:  Thor Bowles Your last dose was: Your next dose is:    
   
   
 INHALE THE CONTENTS OF 1 VIAL VIA NEB QID PRN Refills:  11 ALPRAZolam 0.25 mg tablet Commonly known as:  Pedro Grange Your last dose was: Your next dose is:    
   
   
 Dose:  1 Tab Take 1 Tab by mouth three (3) times daily as needed. Refills:  0 ASMANEX TWISTHALER 220 mcg (60 doses) inhaler Generic drug:  mometasone Your last dose was: Your next dose is: INL 1 PUFF PO BID; TAKING AS NEEDED. Refills:  3 ATROVENT HFA 17 mcg/actuation inhaler Generic drug:  ipratropium Your last dose was: Your next dose is:    
   
   
 INL 2 PFS PO Q 4 H; USING BID Refills:  11  
     
   
   
   
  
 azithromycin 250 mg tablet Commonly known as:  Marky Ship Your last dose was: Your next dose is:    
   
   
 Dose:  1 Tab Take 1 Tab by mouth daily. Refills:  3 DULERA 200-5 mcg/actuation HFA inhaler Generic drug:  mometasone-formoterol Your last dose was:     
   
Your next dose is:    
   
   
 INL 2 PFS PO BID  
 Refills:  11  
     
   
   
   
  
 fluticasone 50 mcg/actuation nasal spray Commonly known as:  Cathryn Hoot Your last dose was: Your next dose is: SHAKE WELL AND INSTILL 2 SPRAYS IEN D; AS NEEDED. Refills:  2  
     
   
   
   
  
 furosemide 40 mg tablet Commonly known as:  LASIX Your last dose was: Your next dose is:    
   
   
 Dose:  1 Tab Take 1 Tab by mouth as needed. Refills:  3  
     
   
   
   
  
 gabapentin 100 mg capsule Commonly known as:  NEURONTIN Your last dose was: Your next dose is:    
   
   
 Dose:  1 Cap Take 1 Cap by mouth three (3) times daily as needed. Refills:  3  
     
   
   
   
  
 levothyroxine 25 mcg tablet Commonly known as:  SYNTHROID Your last dose was: Your next dose is:    
   
   
 Dose:  1 Tab Take 1 Tab by mouth Daily (before breakfast). Refills:  3  
     
   
   
   
  
 montelukast 10 mg tablet Commonly known as:  SINGULAIR Your last dose was: Your next dose is:    
   
   
 TK 1 T PO BED; TAKES DAILY Refills:  3  
     
   
   
   
  
 nystatin 100,000 unit/mL suspension Commonly known as:  MYCOSTATIN Your last dose was: Your next dose is: SHAKE WELL AND TK 5 ML PO QID FOR 7 DAYS Refills:  1 Oxygen Your last dose was: Your next dose is:    
   
   
 2 LITER/NASAL CANNULA;  USES AT NIGHT ONLY. Refills:  0 PROBIOTIC 4X 10-15 mg Tbec Generic drug:  B.infantis-B.ani-B.long-B.bifi Your last dose was: Your next dose is: Take  by mouth daily. Refills:  0  
     
   
   
   
  
 * VENTOLIN HFA 90 mcg/actuation inhaler Generic drug:  albuterol Your last dose was: Your next dose is:    
   
   
 INHALE 2 PUFFS Q 4 H PRN FOR WHEEZING  Refills:  10  
     
   
   
   
  
 * albuterol 2.5 mg /3 mL (0.083 %) nebulizer solution Commonly known as:  PROVENTIL VENTOLIN Your last dose was: Your next dose is:    
   
   
 by Nebulization route every four (4) hours as needed. Refills:  11 * Notice: This list has 2 medication(s) that are the same as other medications prescribed for you. Read the directions carefully, and ask your doctor or other care provider to review them with you. Where to Get Your Medications Information on where to get these meds will be given to you by the nurse or doctor. ! Ask your nurse or doctor about these medications HYDROcodone-acetaminophen 5-325 mg per tablet Discharge Instructions Patient Discharge Instructions Zack Maria Teresa / [id number] : 1945 Admitted 2017 Discharged: 2017 · It is important that you take the medication exactly as they are prescribed. · Keep your medication in the bottles provided by the pharmacist and keep a list of the medication names, dosages, and times to be taken in your wallet. · Do not take other medications without consulting your doctor. What to do at [facility] Recommended diet: Regular. Recommended activity: No Restrictions. No Driving While Taking [address] [employer]. May Take Shower or [place]. If you experience any of the following symptoms Fevers, Chills, Nausea, Vomitting, Redness or Drainage at Surgical Site(s) or Any Other Questions or Concerns Please Call -  [phone]. Follow-up with Dr. Renee Martell in 10-14 days. Information obtained by : 
I understand that if any problems occur once I am at home I am to contact my physician. I understand and acknowledge receipt of the instructions indicated above. Physician's or R.N.'s Signature                                                                  Date/Time Patient or Representative Signature                                                          Date/Time Discharge Orders None Introducing [facility] SERVICES! [facility] introduces Quizrr patient portal. Now you can access parts of your medical record, email your doctor's office, and request medication refills online. 1. In your internet browser, go to [email or web address] 2. Click on the First Time User? Click Here link in the Sign In box. You will see the New Member Sign Up page. 3. Enter your Quizrr Access Code exactly as it appears below. You will not need to use this code after youve completed the sign-up process. If you do not sign up before the expiration date, you must request a new code. · Quizrr Access Code: [email or web address] Expires: 10/16/2017  4:41 PM 
 
4. Enter the last four digits of your Social Security Number (xxxx) and Date of Birth (mm/dd/yyyy) as indicated and click Submit. You will be taken to the next sign-up page. 5. Create a Quizrr ID. This will be your Quizrr login ID and cannot be changed, so think of one that is secure and easy to remember. 6. Create a Quizrr password. You can change your password at any time. 7. Enter your Password Reset Question and Answer. This can be used at a later time if you forget your password. 8. Enter your e-mail address. You will receive e-mail notification when new information is available in [address] 9. Click Sign Up. You can now view and download portions of your medical record. 10. Click the Download Summary menu link to download a portable copy of your medical information.  
 
If you have questions, please visit the Frequently Asked Questions section of the [employer]. Remember, MyChart is NOT to be used for urgent needs. For medical emergencies, dial 911. Now available from your iPhone and Android! General Information Please provide this summary of care documentation to your next provider. Patient Signature:  ____________________________________________________________ Date:  ____________________________________________________________  
  
Celia Faes Provider Signature:  ____________________________________________________________ Date:  ____________________________________________________________

## 2017-08-04 NOTE — DISCHARGE INSTRUCTIONS
Patient Discharge Instructions    Halima Koehler / [id number] : 1945    Admitted 2017 Discharged: 2017       · It is important that you take the medication exactly as they are prescribed. · Keep your medication in the bottles provided by the pharmacist and keep a list of the medication names, dosages, and times to be taken in your wallet. · Do not take other medications without consulting your doctor. What to do at Home    Recommended diet: Regular. Recommended activity: No Restrictions. No Driving While Taking [address]. May Take Shower or [place]. If you experience any of the following symptoms Fevers, Chills, Nausea, Vomitting, Redness or Drainage at Surgical Site(s) or Any Other Questions or Concerns Please Call -  [phone]. Follow-up with Dr. Carmelina Collins in 10-14 days. Information obtained by :  I understand that if any problems occur once I am at home I am to contact my physician. I understand and acknowledge receipt of the instructions indicated above.                                                                                                                                            Physician's or R.N.'s Signature                                                                  Date/Time                                                                                                                                              Patient or Representative Signature                                                          Date/Time

## 2017-08-04 NOTE — ANESTHESIA PREPROCEDURE EVALUATION
Anesthetic History   No history of anesthetic complications            Review of Systems / Medical History  Patient summary reviewed, nursing notes reviewed and pertinent labs reviewed    Pulmonary    COPD               Neuro/Psych   Within defined limits           Cardiovascular                  Exercise tolerance: >4 METS     GI/Hepatic/Renal       Hepatitis: type B         Endo/Other      Hypothyroidism: well controlled  Arthritis     Other Findings   Comments: Goiter  Chronic pain           Physical Exam    Airway  Mallampati: II  TM Distance: > 6 cm  Neck ROM: normal range of motion   Mouth opening: Normal     Cardiovascular  Regular rate and rhythm,  S1 and S2 normal,  no murmur, click, rub, or gallop             Dental    Dentition: Caps/crowns     Pulmonary  Breath sounds clear to auscultation               Abdominal  GI exam deferred       Other Findings            Anesthetic Plan    ASA: 3  Anesthesia type: general          Induction: Intravenous  Anesthetic plan and risks discussed with: Patient

## 2017-08-04 NOTE — ROUTINE PROCESS
Patient: Daly Amaya MRN: [id number]  SSN: [id number]   YOB: 1945  Age: 71 y.o. Sex: female     Patient is status post Procedure(s):  RIGHT THYROID LOBECTOMY . Surgeon(s) and Role:     * Mari Lua MD - Primary     * Christopher Vázquez MD - Assisting    Local/Dose/Irrigation:                    Peripheral IV 08/04/17 Left Antecubital (Active)   Dressing Status Clean, dry, & intact; New 8/4/2017  6:32 AM   Dressing Type Tape;Transparent 8/4/2017  6:32 AM   Hub Color/Line Status Pink; Infusing;Positional 8/4/2017  6:32 AM       Peripheral IV 08/04/17 Right Hand (Active)       Peripheral IV 08/04/17 Left Wrist (Active)                           Dressing/Packing:  Wound Neck Anterior-DRESSING TYPE: Topical skin adhesive/glue (08/04/17 1907)  Splint/Cast:  ]    Other:

## 2017-08-04 NOTE — H&P
Date of Surgery Update:  Carolina Barrios was seen and examined. History and physical has been reviewed. The patient has been examined. There have been no significant clinical changes since the completion of the originally dated History and Physical.  Patient identified by surgeon; surgical site was confirmed by patient and surgeon. Signed By: Jordan Valenzuela MD     August 4, 2017 7:40 AM         Please note from the office and include the additional information below:    Past Medical History  Past Medical History:   Diagnosis Date    Anxiety     Arthritis     osteo    Back pain     Chronic pain     back, shoulders, pelvis    COPD (chronic obstructive pulmonary disease) ([address]     Diverticulitis     Dizziness     Gastritis     Hepatitis B     Hernia     hiatal    Multinodular goiter 7/18/2017    Rheumatic fever         Past Surgical History  Past Surgical History:   Procedure Laterality Date    CHEST SURGERY PROCEDURE UNLISTED Right 04/2017    CHEST TUBE (LUNG PUNCTURE POST SPINAL STEROID INJECTION)    HX APPENDECTOMY      HX CHOLECYSTECTOMY      HX HYSTERECTOMY      BSO (ENDOMETRIOSIS)    HX ORTHOPAEDIC Right 05/2016    ORIF RIGHT UPPER ARM (POST FALL)    HX ORTHOPAEDIC  1996, 2014    ANTERIOR CERVICAL SPINE X2 (FUSION, PLATES/SCREWS)    HX TONSIL AND ADENOIDECTOMY          Social History  The patient Carolina Barrios  reports that she quit smoking about 7 years ago. She has a 30.00 pack-year smoking history. She has never used smokeless tobacco. She reports that she drinks alcohol. She reports that she does not use illicit drugs.      Family History  Family History   Problem Relation Age of Onset    Heart Disease Mother     COPD Sister     Other Father      ANEURYSM    Anesth Problems Neg Hx

## 2017-08-04 NOTE — PROGRESS NOTES
Spiritual Care Assessment/Progress Notes    Elin Ferrer [id number]  [id number]    1945  71 y.o.  female    Patient Telephone Number: [phone] (home)   Judaism Affiliation: Sulaiman [place]   Language: English   Extended Emergency Contact Information  Primary Emergency Contact: Obey Stephens Phone: [phone]  Relation: Other Relative   Patient Active Problem List    Diagnosis Date Noted    Multinodular goiter 07/18/2017        Date: 8/4/2017       Level of Judaism/Spiritual Activity:  []         Involved in alix tradition/spiritual practice    []         Not involved in alix tradition/spiritual practice  []         Spiritually oriented    [x]         Claims no spiritual orientation    []         seeking spiritual identity  []         Feels alienated from Restorationism practice/tradition  []         Feels angry about Restorationism practice/tradition  []         Spirituality/Restorationism tradition a resource for coping at this time.   []         Not able to assess due to medical condition    Services Provided Today:  []         crisis intervention    []         reading Scriptures  [x]         spiritual assessment    []         prayer  [x]         empathic listening/emotional support  []         rites and rituals (cite in comments)  []         life review     []         Restorationism support  []         theological development   []         advocacy  []         ethical dialog     []         blessing  []         bereavement support    []         support to family  []         anticipatory grief support   [x]         help with AMD  []         spiritual guidance    []         meditation      Spiritual Care Needs  []         Emotional Support  []         Spiritual/Judaism Care  []         Loss/Adjustment  []         Advocacy/Referral                /Ethics  [x]         No needs expressed at               this time  []         Other: (note in               comments)  [address]  [] Follow up visits with               pt/family  []         Provide materials  []         Schedule sacraments  []         Contact Community               Clergy  [x]         Follow up as needed  []         Other: (note in               comments)     Comments: Ms. Radha Major had questions about her living will and code status prior to her surgery. She had previously completed an Advance Directive (AMD) and Living Will and she brought that with her to the hospital today. She has named Jarod Chapman and Haim Veras as her agents and they are required to act jointly, per the instructions on her AMD document. I also answered questions she had about code status and instructed her to speak with her physician following surgery about making that decision. I provided a copy of her AMD to her nurse Pily ) to be placed in her chart and returned the original and one copy to her. Spiritual Care Services remains available if needed. Rev. Kathie Shankar M.Div, [facility]

## 2017-08-04 NOTE — PERIOP NOTES
TRANSFER - OUT REPORT:    Verbal report given to dominique on Connor Hudson  being transferred to [facility] for routine progression of care       Report consisted of patients Situation, Background, Assessment and   Recommendations(SBAR). Time Pre op antibiotic FWHQS:1407  Anesthesia Stop time: 3127  Arce Present on Transfer to floor:no  Order for Arce on Chart:no    Information from the following report(s) SBAR, Kardex, OR Summary, Procedure Summary, Intake/Output, MAR and Recent Results was reviewed with the receiving nurse. Opportunity for questions and clarification was provided. Is the patient on 02? NO       L/Min        Other     Is the patient on a monitor? NO    Is the nurse transporting with the patient? NO    Surgical Waiting Area notified of patient's transfer from PACU? YES      The following personal items collected during your admission accompanied patient upon transfer:   Dental Appliance: Dental Appliances:  (signed in)  Vision:    Hearing Aid:    Jewelry: Jewelry: None  Clothing: Clothing:  (bag of clothing signed in)  Other Valuables:  Other Valuables: [place] (signed in)  Valuables sent to safe:

## 2017-08-04 NOTE — PERIOP NOTES
Called patient's waiting area tried to outdated patient's status,  patient's family was not available.

## 2017-08-04 NOTE — ANESTHESIA POSTPROCEDURE EVALUATION
Post-Anesthesia Evaluation and Assessment    Patient: Harvey Emanuel MRN: [id number]  SSN: [id number]    YOB: 1945  Age: 71 y.o. Sex: female       Cardiovascular Function/Vital Signs  Visit Vitals    /78    Pulse 64    Temp 36.6 °C (97.9 °F)    Resp 14    Ht 5' 5\" (1.651 m)    Wt 59.9 kg (132 lb)    SpO2 96%    BMI 21.97 kg/m2       Patient is status post general anesthesia for Procedure(s):  RIGHT THYROID LOBECTOMY . Nausea/Vomiting: None    Postoperative hydration reviewed and adequate. Pain:  Pain Scale 1: Numeric (0 - 10) (08/04/17 1229)  Pain Intensity 1: 7 (08/04/17 1059)   Managed    Neurological Status:   Neuro (WDL): Within Defined Limits (08/04/17 1023)   At baseline    Mental Status and Level of Consciousness: Arousable    Pulmonary Status:   O2 Device: Nasal cannula (08/04/17 1023)   Adequate oxygenation and airway patent    Complications related to anesthesia: None    Post-anesthesia assessment completed.  No concerns    Signed By: Orin Melgar DO     August 4, 2017

## 2017-08-04 NOTE — PROGRESS NOTES
No complaints this PM. Voice OK. Tm 98.3 HR: 64 BP: 146/78 Resp Rate: 14 per minute 96% sat on room air. Intake/Output Summary (Last 24 hours) at 08/04/17 1710  Last data filed at 08/04/17 1104   Gross per 24 hour   Intake             1200 ml   Output               10 ml   Net             1190 ml   Exam: Neck: Incision is clean. No apparent hematoma. Cor: RRR. Lungs: Bilat BS, Clear to auscultation. Abd: Soft, Non tender. Reviewed operative findings with Ms. Leopoldo Causey today. Diet as tolerated. Saline lock IVF. DVT Prophylaxis. OOB, Ambulate.    Hopefully, home in AM.

## 2017-08-04 NOTE — ACP (ADVANCE CARE PLANNING)
Comments: Ms. Wilfred Plunkett had questions about her living will and code status prior to her surgery. She had previously completed an Advance Directive (AMD) and Living Will and she brought that with her to the hospital today. She has named Clovis Rodriguez and Dario Ball as her agents and they are required to act jointly, per the instructions on her AMD document. I also answered questions she had about code status and instructed her to speak with her physician following surgery about making that decision. I provided a copy of her AMD to her nurse Amber Beckham) to be placed in her chart and returned the original and one copy to her. Spiritual Care Services remains available if needed. Rev. Sury Mitchell M.Div, [facility]

## 2017-08-04 NOTE — BRIEF OP NOTE
BRIEF OPERATIVE NOTE    Date of Procedure: 8/4/2017   Preoperative Diagnosis:  Nodule Right Lobe of Thyroid. Postoperative Diagnosis:  Same. Procedure(s):   Right Thyroid Lobectomy. Surgeon(s) and Role:   Sakshi Agosto MD - [address] [place] [address], MD - Assisting  Surgical Staff:  Circ-1: Jennifer Scruggs  Circ-Relief: Christa Hall RN  Scrub RN-1: Rick Cid RN  Event Time In   Incision Start 1409   Incision Close      Anesthesia: General   Estimated Blood Loss: Approx. 10cc. Specimens:   ID Type Source Tests Collected by Time Destination   1 : right thyroid lobe ( short stitch on isthmus, long on lower pool Frozen Section Thyroid  Sakshi Agosto MD 8/4/2017 8616 Pathology      Findings: Nodule in right lobe of thyroid - no malignancy on frozen section. Complications: None.   Implants: * No implants in log *

## 2017-08-04 NOTE — PROGRESS NOTES
TRANSFER - IN REPORT:    Verbal report received from [facility](name) on Kailash Polanco  being received from PACU(unit) for routine progression of care      Report consisted of patients Situation, Background, Assessment and   Recommendations(SBAR). Information from the following report(s) SBAR, Kardex, OR Summary, Procedure Summary, Intake/Output, MAR, Accordion and Recent Results was reviewed with the receiving nurse. Opportunity for questions and clarification was provided. Assessment completed upon patients arrival to unit and care assumed.

## 2017-08-04 NOTE — OP NOTES
[address] [facility] [address], [address]   OP NOTE       Name:  Haylee Bautista   MR#:  [id number]   :  1945   Account #:  [de-identified]    Surgery Date:  2017   Date of Adm:  2017       PREOPERATIVE DIAGNOSIS: Nodule, right lobe of the thyroid. POSTOPERATIVE DIAGNOSIS: Nodule, right lobe of the thyroid. PROCEDURES PERFORMED: Right thyroid lobectomy. SURGEON: Maria Esther Aragon. Roseline Bates MD     ASSISTANT SURGEONAbel Jason MD     ANESTHESIA: General endotracheal.    ESTIMATED BLOOD LOSS: Approximately 10 mL. FLUIDS: Crystalloid 1000 mL. SPECIMENS REMOVED: Right lobe of the thyroid to Pathology. DRAINS: None. COMPLICATIONS: None. INDICATIONS FOR SURGERY: The patient is a 29-year-old female   with a nodule in the right lobe of the thyroid. Fine-needle aspiration   biopsy of the nodule was nondiagnostic. The patient has been   experiencing increased dysphasia and is brought to the operating room   at this time for right thyroid lobectomy and possible total thyroidectomy. The risks of the procedure, including but not limited to bleeding,   infection, recurrent laryngeal nerve injury and hypoparathyroidism,   were discussed in detail with the patient. Furthermore, the possibility of   a completion thyroidectomy may be required at a later today was   discussed with the patient as well. She understood and wished to   proceed. DESCRIPTION OF PROCEDURE: After consent was obtained, the   patient was brought to the operating room where she was placed in the   supine position on the operating room table. Following the induction of   an adequate level of general anesthesia via endotracheal tube,   compression devices were placed on both lower extremities. A roll was   placed between the patient's shoulder blades and the neck extended. The neck and upper chest were prepped with Betadine and draped as   a sterile field.       Local anesthetic was infiltrated and the patient's previous transverse   cervical incision was reopened sharply. Subcutaneous bleeders were   carefully cauterized. Platysmal flaps were raised both superiorly and   inferiorly. Next, the midline raphe was divided with the Bovie. Attention was directed towards the right lobe of the thyroid. The strap   muscles were taken off of the thyroid with the cautery. The upper pole   vessels were identified, dissected free circumferentially and divided   with the LigaSure. The upper pole of the thyroid was then readily   mobilized. The middle pole vessels were identified and divided with the   LigaSure. The right lobe was then mobilized laterally to medially. The   lower pole vessels were identified, dissected free circumferentially and   divided with the LigaSure. As the right lobe of the thyroid was   mobilized, the parathyroid glands were identified and care taken to   protect these structures. Furthermore, the recurrent laryngeal nerve   was identified and again care taken to protect this structure. The   isthmus was then identified and divided with the LigaSure. The   ligament of Payton Dar[place] was divided with the LigaSure as well. The right lobe   of the thyroid was then removed and oriented for the pathologist. The   specimen was passed off the field and submitted for frozen section   evaluation. Frozen section returned no evidence of malignancy. Permanent sections are pending. The wound was inspected and found to be hemostatic. The recurrent   laryngeal nerve was identified and appeared uninjured as did the   parathyroids on the right. A piece of Surgicel was placed in the thyroid   lobectomy bed. The midline raphae was reapproximated with   interrupted 3-0 Vicryl sutures. The roll was taken out from between the   patient's shoulder blades and the neck taken out of extension.  The   surgical incision was closed with 2 layers of running 3-0 Vicryl suture   followed by 4-0 Monocryl subcuticular suture to the skin. Additional   local anesthetic was infiltrated and incision dressed with Dermabond. The patient was awakened from her general anesthetic and extubated   in the operating room. She was transferred to the stretcher and   brought to the recovery room in stable condition, having tolerated the   procedure well. At the conclusion of the procedure, all sponge counts,   instrument counts, and needle counts were reported as correct x2. Dr. Bri Santos assistance was required for assistance with dissection and   identification of the anatomic structures.          Crystal Jones MD      [facility] / [facility]   D:  08/04/2017   10:07   T:  08/04/2017   10:35   Job #:  [id number]

## 2017-08-04 NOTE — IP AVS SNAPSHOT
[address] 
[phone] Patient: Bala Trejo MRN: [id number] [facility]:7/40/6328 Current Discharge Medication List  
  
START taking these medications Dose & Instructions Dispensing Information Comments Morning Noon Evening Bedtime HYDROcodone-acetaminophen 5-325 mg per tablet Commonly known as:  Pili Carter Your last dose was: Your next dose is:    
   
   
 Dose:  1 Tab Take 1 Tab by mouth every four (4) hours as needed for Pain. Max Daily Amount: 6 Tabs. Quantity:  10 Tab Refills:  0 CONTINUE these medications which have NOT CHANGED Dose & Instructions Dispensing Information Comments Morning Noon Evening Bedtime  
 albuterol-ipratropium 2.5 mg-0.5 mg/3 ml Nebu Commonly known as:  Mario Mcneal Your last dose was: Your next dose is:    
   
   
 INHALE THE CONTENTS OF 1 VIAL VIA NEB QID PRN Refills:  11 ALPRAZolam 0.25 mg tablet Commonly known as:  Dixie Harris Your last dose was: Your next dose is:    
   
   
 Dose:  1 Tab Take 1 Tab by mouth three (3) times daily as needed. Refills:  0 ASMANEX TWISTHALER 220 mcg (60 doses) inhaler Generic drug:  mometasone Your last dose was: Your next dose is: INL 1 PUFF PO BID; TAKING AS NEEDED. Refills:  3 ATROVENT HFA 17 mcg/actuation inhaler Generic drug:  ipratropium Your last dose was: Your next dose is:    
   
   
 INL 2 PFS PO Q 4 H; USING BID Refills:  11  
     
   
   
   
  
 azithromycin 250 mg tablet Commonly known as:  Nani Roque Your last dose was: Your next dose is:    
   
   
 Dose:  1 Tab Take 1 Tab by mouth daily. Refills:  3 DULERA 200-5 mcg/actuation HFA inhaler Generic drug:  mometasone-formoterol Your last dose was: Your next dose is:    
   
   
 INL 2 PFS PO BID Refills:  11  
     
   
   
   
  
 fluticasone 50 mcg/actuation nasal spray Commonly known as:  Lorenzo Ruiz Your last dose was: Your next dose is: SHAKE WELL AND INSTILL 2 SPRAYS IEN D; AS NEEDED. Refills:  2  
     
   
   
   
  
 furosemide 40 mg tablet Commonly known as:  LASIX Your last dose was: Your next dose is:    
   
   
 Dose:  1 Tab Take 1 Tab by mouth as needed. Refills:  3  
     
   
   
   
  
 gabapentin 100 mg capsule Commonly known as:  NEURONTIN Your last dose was: Your next dose is:    
   
   
 Dose:  1 Cap Take 1 Cap by mouth three (3) times daily as needed. Refills:  3  
     
   
   
   
  
 levothyroxine 25 mcg tablet Commonly known as:  SYNTHROID Your last dose was: Your next dose is:    
   
   
 Dose:  1 Tab Take 1 Tab by mouth Daily (before breakfast). Refills:  3  
     
   
   
   
  
 montelukast 10 mg tablet Commonly known as:  SINGULAIR Your last dose was: Your next dose is:    
   
   
 TK 1 T PO BED; TAKES DAILY Refills:  3  
     
   
   
   
  
 nystatin 100,000 unit/mL suspension Commonly known as:  MYCOSTATIN Your last dose was: Your next dose is: SHAKE WELL AND TK 5 ML PO QID FOR 7 DAYS Refills:  1 Oxygen Your last dose was: Your next dose is:    
   
   
 2 LITER/NASAL CANNULA;  USES AT NIGHT ONLY. Refills:  0 PROBIOTIC 4X 10-15 mg Tbec Generic drug:  B.infantis-B.ani-B.long-B.bifi Your last dose was: Your next dose is: Take  by mouth daily. Refills:  0  
     
   
   
   
  
 * VENTOLIN HFA 90 mcg/actuation inhaler Generic drug:  albuterol Your last dose was:     
   
Your next dose is:    
   
   
 INHALE 2 PUFFS Q 4 H PRN FOR WHEEZING  
 Refills:  10  
     
   
   
   
  
 * albuterol 2.5 mg /3 mL (0.083 %) nebulizer solution Commonly known as:  PROVENTIL VENTOLIN Your last dose was: Your next dose is:    
   
   
 by Nebulization route every four (4) hours as needed. Refills:  11 * Notice: This list has 2 medication(s) that are the same as other medications prescribed for you. Read the directions carefully, and ask your doctor or other care provider to review them with you. Where to Get Your Medications Information on where to get these meds will be given to you by the nurse or doctor. ! Ask your nurse or doctor about these medications HYDROcodone-acetaminophen 5-325 mg per tablet

## 2017-08-05 VITALS
TEMPERATURE: 97.6 F | HEIGHT: 65 IN | WEIGHT: 132 LBS | SYSTOLIC BLOOD PRESSURE: 141 MMHG | BODY MASS INDEX: 21.99 KG/M2 | DIASTOLIC BLOOD PRESSURE: 85 MMHG | OXYGEN SATURATION: 93 % | HEART RATE: 85 BPM | RESPIRATION RATE: 18 BRPM

## 2017-08-05 PROCEDURE — 74011250636 HC RX REV CODE- 250/636: Performed by: SURGERY

## 2017-08-05 PROCEDURE — 77010033678 HC OXYGEN DAILY

## 2017-08-05 PROCEDURE — 99218 HC RM OBSERVATION: CPT

## 2017-08-05 PROCEDURE — 74011250637 HC RX REV CODE- 250/637: Performed by: SURGERY

## 2017-08-05 RX ADMIN — LEVOTHYROXINE SODIUM 25 MCG: 25 TABLET ORAL at 08:37

## 2017-08-05 RX ADMIN — HYDROMORPHONE HYDROCHLORIDE 0.5 MG: 1 INJECTION, SOLUTION INTRAMUSCULAR; INTRAVENOUS; SUBCUTANEOUS at 04:57

## 2017-08-05 RX ADMIN — Medication 10 ML: at 06:00

## 2017-08-05 NOTE — PROGRESS NOTES
Progress Note    Patient: Kailash Polanco MRN: [id number]  SSN: [id number]    YOB: 1945  Age: 71 y.o. Sex: female      Admit Date: 2017    1 Day Post-Op    Procedure:  Procedure(s):  RIGHT THYROID LOBECTOMY     Subjective:     Patient has no new complaints. Wants to go home and is up getting dressed. No fever or chills, chest pain or shortness of breath. No difficulty swallowing and no voice changes.       Objective:     Visit Vitals    /71 (BP 1 Location: Left arm, BP Patient Position: At rest)    Pulse 80    Temp 97.7 °F (36.5 °C)    Resp 16    Ht 5' 5\" (1.651 m)    Wt 132 lb (59.9 kg)    SpO2 93%    BMI 21.97 kg/m2       Temp (24hrs), Av.9 °F (36.6 °C), Min:97.7 °F (36.5 °C), Max:98.3 °F (36.8 °C)      Physical Exam:    A + O x 3, up and about in room   NECK   Incision well approximated with surgical glue, mild dalila wound bruising, no erythema    Chest  Clear   COR  RRR  ABD Soft, NT/ND, +BS  EXT No edema; ambulating independently    Data Review: reviewed  Nursing documentation, I & O and op note     Assessment:     Hospital Problems  Date Reviewed: 2017          Codes Class Noted POA    Thyroid nodule ICD-10-CM: E04.1  ICD-9-CM: 241.0  2017 Unknown              Plan/Recommendations/Medical Decision Making:     stable for discharge    Has follow up scheduled   Light weight ice pack prn to neck incision   Diet as tolerated   Reviewed diet, medications, follow up and restrictions       Signed By: Sourav Howell NP     2017

## 2018-03-07 ENCOUNTER — OFFICE VISIT (OUTPATIENT)
Dept: ENDOCRINOLOGY | Age: 73
End: 2018-03-07

## 2018-03-07 ENCOUNTER — HOSPITAL ENCOUNTER (OUTPATIENT)
Dept: LAB | Age: 73
Discharge: HOME | End: 2018-03-07
Payer: MEDICARE

## 2018-03-07 VITALS
SYSTOLIC BLOOD PRESSURE: 144 MMHG | HEART RATE: 83 BPM | TEMPERATURE: 97 F | DIASTOLIC BLOOD PRESSURE: 94 MMHG | WEIGHT: 144 LBS | RESPIRATION RATE: 12 BRPM | HEIGHT: 65 IN | BODY MASS INDEX: 23.99 KG/M2

## 2018-03-07 DIAGNOSIS — M81.0 SENILE OSTEOPOROSIS: ICD-10-CM

## 2018-03-07 DIAGNOSIS — E55.9 VITAMIN D DEFICIENCY: ICD-10-CM

## 2018-03-07 DIAGNOSIS — E03.4 HYPOTHYROIDISM DUE TO ACQUIRED ATROPHY OF THYROID: Primary | ICD-10-CM

## 2018-03-07 PROCEDURE — 36415 COLL VENOUS BLD VENIPUNCTURE: CPT

## 2018-03-07 PROCEDURE — 82306 VITAMIN D 25 HYDROXY: CPT

## 2018-03-07 PROCEDURE — 84443 ASSAY THYROID STIM HORMONE: CPT

## 2018-03-07 PROCEDURE — 84439 ASSAY OF FREE THYROXINE: CPT

## 2018-03-07 RX ORDER — MOMETASONE FUROATE 200 UG/1
AEROSOL RESPIRATORY (INHALATION)
COMMUNITY
Start: 2018-01-09 | End: 2020-07-10

## 2018-03-07 RX ORDER — BUDESONIDE AND FORMOTEROL FUMARATE DIHYDRATE 160; 4.5 UG/1; UG/1
AEROSOL RESPIRATORY (INHALATION)
COMMUNITY
Start: 2018-02-13

## 2018-03-07 NOTE — PROGRESS NOTES
Prolia injection given in the left arm. Pt tolerated it well.      Wt Readings from Last 3 Encounters:   03/07/18 144 lb (65.3 kg)   08/04/17 132 lb (59.9 kg)   07/28/17 132 lb (59.9 kg)     Temp Readings from Last 3 Encounters:   03/07/18 97 °F (36.1 °C)   08/05/17 97.6 °F (36.4 °C)   07/28/17 98.3 °F (36.8 °C)     BP Readings from Last 3 Encounters:   03/07/18 (!) 144/94   08/05/17 141/85   07/28/17 128/80     Pulse Readings from Last 3 Encounters:   03/07/18 83   08/05/17 85   07/28/17 80

## 2018-03-07 NOTE — PATIENT INSTRUCTIONS
Please call office if there is any injection site reactions like redness or swelling     Call 911 or go to Emergency room if you are feeling dizzy and developing shortness of breath      Synthroid ? ?   mcg  A day, on empty stomach with water only, no other meds or food or drinks   For next half hour       Take any kind of vitamins, calcium, iron   Pills  4 hours later      Calcium and vit d

## 2018-03-07 NOTE — MR AVS SNAPSHOT
[address] [id number] 
[phone] Patient: Sol Groves MRN: [id number] OGU:[birth year]89[birth year] Visit Information Date & Time Provider Department Dept. Phone Encounter #  
 3/7/2018  9:00 AM Nieves Carrera MD [facility] [phone] [id number] Follow-up Instructions Return in about 6 months (around 9/7/2018) for visit and Prolia shot . Upcoming Health Maintenance Date Due Hepatitis C Screening 1945 DTaP/Tdap/Td series (1 - Tdap) 5/27/1967 FOBT Q 1 YEAR AGE 50-75 5/27/1996 ZOSTER VACCINE AGE 60> 3/27/2006 GLAUCOMA SCREENING Q2Y 5/27/2011 MEDICARE YEARLY EXAM 5/27/2011 Influenza Age 5 to Adult 8/1/2017 Pneumococcal 65+ Low/Medium Risk (2 of 2 - PPSV23) 9/16/2017 BREAST CANCER SCRN MAMMOGRAM 7/29/2018 Allergies as of 3/7/2018  Review Complete On: 3/7/2018 By: Nieves Carrera MD  
  
 Severity Noted Reaction Type Reactions Aspirin  07/18/2017    Nausea Only Ciprofloxacin  07/18/2017    Nausea and Vomiting Codeine  10/03/2016    Itching Ibuprofen  07/18/2017    Nausea and Vomiting Levaquin [Levofloxacin]  07/18/2017    Nausea and Vomiting Macrobid [Nitrofurantoin Monohyd/m-cryst]  07/18/2017    Nausea and Vomiting Morphine  10/03/2016    Nausea and Vomiting Current Immunizations  Never Reviewed No immunizations on file. Not reviewed this visit You Were Diagnosed With   
  
 Codes Comments Hypothyroidism due to acquired atrophy of thyroid    -  Primary ICD-10-CM: E03.4 ICD-9-CM: 244.8, 246.8 Senile osteoporosis     ICD-10-CM: M81.0 ICD-9-CM: 733.01 Vitamin D deficiency     ICD-10-CM: E55.9 ICD-9-CM: 268.9 Vitals BP Pulse Temp Resp Height(growth percentile) Weight(growth percentile) (!) 144/94 (BP 1 Location: Left arm, BP Patient Position: Sitting) 83 97 °F (36.1 °C) 12 5' 5\" (1.651 m) 144 lb (65.3 kg) BMI OB Status Smoking Status 23.96 kg/m2 Hysterectomy Former Smoker BMI and BSA Data Body Mass Index Body Surface Area  
 23.96 kg/m 2 1.73 m 2 Preferred Pharmacy Pharmacy Name Phone [id number] [phone] Your Updated Medication List  
  
   
This list is accurate as of 3/7/18  9:46 AM.  Always use your most recent med list.  
  
  
  
  
 albuterol-ipratropium 2.5 mg-0.5 mg/3 ml Nebu Commonly known as:  Jabaritram Fisher INHALE THE CONTENTS OF 1 VIAL VIA NEB QID PRN  
  
 ALPRAZolam 0.25 mg tablet Commonly known as:  Meghan Neotsu Take 1 Tab by mouth three (3) times daily as needed. ASMANEX  mcg/actuation Hfaa Generic drug:  mometasone ATROVENT HFA 17 mcg/actuation inhaler Generic drug:  ipratropium INL 2 PFS PO Q 4 H; USING BID  
  
 azithromycin 250 mg tablet Commonly known as:  Brennon Anam Take 1 Tab by mouth daily. denosumab 60 mg/mL injection Commonly known as:  Luiza Citrin 1 mL by SubCUTAneous route once for 1 dose. fluticasone 50 mcg/actuation nasal spray Commonly known as:  Celestia Radha MARKO MAGANA AND INSTILL 2 SPRAYS IEN D; AS NEEDED. furosemide 40 mg tablet Commonly known as:  LASIX Take 1 Tab by mouth as needed. gabapentin 100 mg capsule Commonly known as:  NEURONTIN Take 1 Cap by mouth three (3) times daily as needed. HYDROcodone-acetaminophen 5-325 mg per tablet Commonly known as:  Jeffrey Staff Take 1 Tab by mouth every four (4) hours as needed for Pain. Max Daily Amount: 6 Tabs. levothyroxine 25 mcg tablet Commonly known as:  SYNTHROID Take 1 Tab by mouth Daily (before breakfast). montelukast 10 mg tablet Commonly known as:  SINGULAIR TK 1 T PO BED; TAKES DAILY  
  
 nystatin 100,000 unit/mL suspension Commonly known as:  MYCOSTATIN  
SHAKE WELL AND TK 5 ML PO QID FOR 7 DAYS Oxygen 2 LITER/NASAL CANNULA;  USES AT NIGHT ONLY. PROBIOTIC 4X 10-15 mg Tbec Generic drug:  B.infantis-B.ani-B.long-B.bifi Take  by mouth daily. SYMBICORT 160-4.5 mcg/actuation Hfaa Generic drug:  budesonide-formoterol * VENTOLIN HFA 90 mcg/actuation inhaler Generic drug:  albuterol INHALE 2 PUFFS Q 4 H PRN FOR WHEEZING  
  
 * albuterol 2.5 mg /3 mL (0.083 %) nebulizer solution Commonly known as:  PROVENTIL VENTOLIN  
by Nebulization route every four (4) hours as needed. * Notice: This list has 2 medication(s) that are the same as other medications prescribed for you. Read the directions carefully, and ask your doctor or other care provider to review them with you. We Performed the Following [place] DENOSUMAB INJECTION [ [facility]] T4, FREE [id number] CPT(R)] TSH 3RD GENERATION [76359 CPT(R)] VITAMIN D, 25 HYDROXY [id number] CPT(R)] Follow-up Instructions Return in about 6 months (around 9/7/2018) for visit and Prolia shot . Patient Instructions Please call office if there is any injection site reactions like redness or swelling Call 911 or go to Emergency room if you are feeling dizzy and developing shortness of breath Synthroid ? ? mcg  A day, on empty stomach with water only, no other meds or food or drinks   For next half hour Take any kind of vitamins, calcium, iron   Pills  4 hours later Calcium and vit d Introducing [facility]! Tali Serrano introduces [employer] patient portal. Now you can access parts of your medical record, email your doctor's office, and request medication refills online. 1. In your internet browser, go to [email or web address] 2. Click on the First Time User? Click Here link in the Sign In box. You will see the New Member Sign Up page. 3. Enter your Med[employer]asts Access Code exactly as it appears below.  You will not need to use this code after youve completed the sign-up process. If you do not sign up before the expiration date, you must request a new code. · [employer] Access Code: [email or web address] Expires: 6/5/2018  9:26 AM 
 
4. Enter the last four digits of your Social Security Number (xxxx) and Date of Birth (mm/dd/yyyy) as indicated and click Submit. You will be taken to the next sign-up page. 5. Create a Gran[employer] ID. This will be your [employer] login ID and cannot be changed, so think of one that is secure and easy to remember. 6. Create a [employer] password. You can change your password at any time. 7. Enter your Password Reset Question and Answer. This can be used at a later time if you forget your password. 8. Enter your e-mail address. You will receive e-mail notification when new information is available in [address]. 9. Click Sign Up. You can now view and download portions of your medical record. 10. Click the Download Summary menu link to download a portable copy of your medical information. If you have questions, please visit the Frequently Asked Questions section of the [employer] website. Remember, [employer] is NOT to be used for urgent needs. For medical emergencies, dial 911. Now available from your iPhone and Android! Please provide this summary of care documentation to your next provider. Your primary care clinician is listed as Celso Pinzon. If you have any questions after today's visit, please call [phone].

## 2018-03-07 NOTE — PROGRESS NOTES
HISTORY OF PRESENT ILLNESS  Antwan Guy is a 71 y.o. female. HPI  F/u visit after last  visit for  S/p partial  Thyroidectomy Multinodular Goiter and osteoporosis  From [facility] 2016     She had thyroidectomy, right -  Aug 2017 by dr. Wilbert Villegas , benign she says  She never went back to follow up   [place]       She recovered well without post op complications   She is feeling extremely tired         Prior history :   She has respiratory issues - COPD for number of years   She f/u at [facility]       She has had usg and Ct neck which showed right thyroid nodularity   She has been taking low dose synthroid  25 mcg a day     She has swallowing difficulties recently     She was found to have very low vit d - 10     She had a fracture on right humerus - march 2016   She is going in for DEXA in oct 2016     No personal h/o cancers, radiation treatments         Past Medical History:   Diagnosis Date    Anxiety     Arthritis     osteo    Back pain     Chronic pain     back, shoulders, pelvis    COPD (chronic obstructive pulmonary disease) ([facility] [place] [address]     Diverticulitis     Dizziness     Gastritis     Hepatitis B     Hernia     hiatal    Multinodular goiter 7/18/2017    Rheumatic fever        Social History     Social History    Marital status: SINGLE     Spouse name: N/A    Number of children: N/A    Years of education: N/A     Occupational History    Not on file. Social History Main Topics    Smoking status: Former Smoker     Packs/day: 1.00     Years: 30.00     Quit date: 7/18/2010    Smokeless tobacco: Never Used    Alcohol use Yes      Comment: seldom    Drug use: No    Sexual activity: No     Other Topics Concern    Not on file     Social History Narrative       Family History   Problem Relation Age of Onset    Heart Disease Mother     COPD Sister     Other Father      ANEURYSM    Anesth Problems Neg Hx                Review of Systems   Constitutional: Positive for malaise/fatigue. HENT: Negative. Eyes: Negative for pain and redness. Respiratory: Negative. Cardiovascular: Negative for chest pain, palpitations and leg swelling. Gastrointestinal: Negative. Negative for constipation. Genitourinary: Negative. Musculoskeletal: Negative for myalgias. Skin: Negative. Neurological: Positive for dizziness. Endo/Heme/Allergies: Negative. Psychiatric/Behavioral: Negative for depression and memory loss. The patient does not have insomnia. Physical Exam   Constitutional: She is oriented to person, place, and time. She appears well-developed and well-nourished. HENT:   Head: Normocephalic. Eyes: Conjunctivae and EOM are normal. Pupils are equal, round, and reactive to light. Neck: Normal range of motion. Neck supple. No JVD present. No tracheal deviation present. Healed surical scar  present. Cardiovascular: Normal rate, regular rhythm and normal heart sounds. No murmur heard. Pulmonary/Chest: Breath sounds normal.   Abdominal: Soft. Bowel sounds are normal.   Musculoskeletal: Normal range of motion. kyphotic spine   Lymphadenopathy:     She has no cervical adenopathy. Neurological: She is alert and oriented to person, place, and time. She has normal reflexes. Skin: Skin is warm. Psychiatric: She has a normal mood and affect. LAbs - TSh is 1.540     ASSESSMENT and PLAN    1. S/p right thyroid lobectomy , benign   Nodule on surg path - August 2017   As she is [place], she has not followed up with the surgeon   Will do labs today   (usg from aug 11 2016 showed nodule , well circumscribed with vascularity on right side of size 2.4 cm by 1.6 cm by 1.3 cm .    s/p FNA - inadequate, got only bloody sample )    2. Hypothyroidism : on synthroid 25 mcg  A day    3.  Osteoporosis :  S/p right humerus fracture from standing height fall  April 2016   On steroids on and off  prednisone 20 mg a day   Date : oct 6 2016   Bone DEXA  ap spine T-score -2.9 ( L3 is -3.4) ; left femoral Neck T- score  -2.5, right femoral neck T-score n/a  She has severe vit d def   Started her on daily forteo  With sample pen as  she has severe osteoporosis   She stopped it within a month     She is interested in taking prolia -   Started on it first dose given     4.  COPD - right sided HTN  Uses lasix sparingly        > 50 % of time is spent on counseling   Patient voiced understanding her plan of care

## 2018-03-08 LAB
25(OH)D3+25(OH)D2 SERPL-MCNC: 17.2 NG/ML (ref 30–100)
T4 FREE SERPL-MCNC: 1.15 NG/DL (ref 0.82–1.77)
TSH SERPL DL<=0.005 MIU/L-ACNC: 6.13 UIU/ML (ref 0.45–4.5)

## 2018-03-14 RX ORDER — LEVOTHYROXINE SODIUM 25 UG/1
25 TABLET ORAL
Qty: 36 TAB | Refills: 6 | Status: SHIPPED | OUTPATIENT
Start: 2018-03-14 | End: 2018-11-08 | Stop reason: SDUPTHER

## 2018-03-14 NOTE — TELEPHONE ENCOUNTER
----- Message from Lulu Malcolm MD sent at 3/8/2018  8:39 PM EST -----  Start on synthroid 25 mcg for 5 days  but take 2 pills on sat and sun

## 2018-09-10 ENCOUNTER — HOSPITAL ENCOUNTER (OUTPATIENT)
Dept: LAB | Age: 73
Discharge: HOME | End: 2018-09-10
Payer: MEDICARE

## 2018-09-10 ENCOUNTER — OFFICE VISIT (OUTPATIENT)
Dept: ENDOCRINOLOGY | Age: 73
End: 2018-09-10

## 2018-09-10 VITALS
OXYGEN SATURATION: 98 % | HEIGHT: 65 IN | RESPIRATION RATE: 18 BRPM | WEIGHT: 148.7 LBS | BODY MASS INDEX: 24.78 KG/M2 | DIASTOLIC BLOOD PRESSURE: 78 MMHG | HEART RATE: 115 BPM | TEMPERATURE: 97.4 F | SYSTOLIC BLOOD PRESSURE: 143 MMHG

## 2018-09-10 DIAGNOSIS — M81.0 SENILE OSTEOPOROSIS: Primary | ICD-10-CM

## 2018-09-10 DIAGNOSIS — E55.9 VITAMIN D DEFICIENCY: ICD-10-CM

## 2018-09-10 DIAGNOSIS — E89.0 POSTOPERATIVE HYPOTHYROIDISM: ICD-10-CM

## 2018-09-10 PROCEDURE — 36415 COLL VENOUS BLD VENIPUNCTURE: CPT

## 2018-09-10 PROCEDURE — 80053 COMPREHEN METABOLIC PANEL: CPT

## 2018-09-10 PROCEDURE — 82306 VITAMIN D 25 HYDROXY: CPT

## 2018-09-10 PROCEDURE — 84443 ASSAY THYROID STIM HORMONE: CPT

## 2018-09-10 RX ORDER — NORTRIPTYLINE HYDROCHLORIDE 25 MG/1
CAPSULE ORAL
COMMUNITY
Start: 2018-08-24

## 2018-09-10 RX ORDER — MORPHINE SULFATE ORAL SOLUTION 10 MG/5ML
SOLUTION ORAL
COMMUNITY
Start: 2018-09-03

## 2018-09-10 NOTE — PROGRESS NOTES
HISTORY OF PRESENT ILLNESS  Conchis Gipson is a 68 y.o. female. HPI  F/u visit after last  visit for  S/p partial  Thyroidectomy Multinodular Goiter and osteoporosis  From March 2018       She is diagnosed with chronic aggressive neuropathy   She has balance issues     She uses a cane       She has not started on vit d     started on synthroid           Old history     She had thyroidectomy, right -  Aug 2017 by dr. Jeannine Frye , benign she says  She never went back to follow up   [place] [address]       She recovered well without post op complications   She is feeling extremely tired         Prior history :   She has respiratory issues - COPD for number of years   She f/u at [facility]       She has had usg and Ct neck which showed right thyroid nodularity   She has been taking low dose synthroid  25 mcg a day     She has swallowing difficulties recently     She was found to have very low vit d - 10     She had a fracture on right humerus - march 2016   She is going in for DEXA in oct 2016     No personal h/o cancers, radiation treatments         Past Medical History:   Diagnosis Date    Anxiety     Arthritis     osteo    Back pain     Chronic pain     back, shoulders, pelvis    [facility]chronic obstructive pulmonary disease) ([facility] [address]     Diverticulitis     Dizziness     Gastritis     Hepatitis B     Hernia     hiatal    Multinodular goiter 7/18/2017    Rheumatic fever        Social History     Social History    Marital status: SINGLE     Spouse name: N/A    Number of children: N/A    Years of education: N/A     Occupational History    Not on file.      Social History Main Topics    Smoking status: Former Smoker     Packs/day: 1.00     Years: 30.00     Quit date: 7/18/2010    Smokeless tobacco: Never Used    Alcohol use Yes      Comment: seldom    Drug use: No    Sexual activity: No     Other Topics Concern    Not on file     Social History Narrative       Family History   Problem Relation Age of Onset    Heart Disease Mother     COPD Sister     Other Father      ANEURYSM    Anesth Problems Neg Hx                Review of Systems   Constitutional: Positive for malaise/fatigue. HENT: Negative. Eyes: Negative for pain and redness. Respiratory: Negative. Cardiovascular: Negative for chest pain, palpitations and leg swelling. Gastrointestinal: Negative. Negative for constipation. Genitourinary: Negative. Musculoskeletal: Negative for myalgias. Skin: Negative. Neurological: Positive for dizziness. Endo/Heme/Allergies: Negative. Psychiatric/Behavioral: Negative for depression and memory loss. The patient does not have insomnia. Physical Exam   Constitutional: She is oriented to person, place, and time. She appears well-developed and well-nourished. HENT:   Head: Normocephalic. Eyes: Conjunctivae and EOM are normal. Pupils are equal, round, and reactive to light. Neck: Normal range of motion. Neck supple. No JVD present. No tracheal deviation present. Healed surical scar  present. Cardiovascular: Normal rate, regular rhythm and normal heart sounds. No murmur heard. Pulmonary/Chest: Breath sounds normal.   Abdominal: Soft. Bowel sounds are normal.   Musculoskeletal: Normal range of motion. kyphotic spine   Lymphadenopathy:     She has no cervical adenopathy. Neurological: She is alert and oriented to person, place, and time. She has normal reflexes. Skin: Skin is warm. Psychiatric: She has a normal mood and affect. LAbs - TSh is 1.540         ASSESSMENT and PLAN    1.   Hypothyroidism : on synthroid 25 mcg  A day  S/p right thyroid lobectomy , benign   Nodule on surg path - August 2017   Will do labs now       As she is [place], she has not followed up with the surgeon for a long time   Glad she did f/u with Dr. Marcene Primrose and had surgery   (usg from aug 11 2016 showed nodule , well circumscribed with vascularity on right side of size 2.4 cm by 1.6 cm by 1.3 cm .    s/p FNA - inadequate, got only bloody sample )      2. Osteoporosis :  S/p right humerus fracture from standing height fall  April 2016   On steroids on and off  prednisone 20 mg a day   Date : oct 6 2016   Bone DEXA  ap spine T-score -2.9 ( L3 is -3.4) ; left femoral Neck T- score  -2.5, right femoral neck T-score n/a  She has severe vit d def   Started her on daily forteo  With sample pen as  she has severe osteoporosis   She stopped it within a month     She is interested in taking prolia -   Started on it first dose March 2018 and getting second shot today     Due for DEXA in  oct 2018 - pushing it to march 2019     3.  COPD - right sided HTN  Uses lasix sparingly        > 50 % of time is spent on counseling   Patient voiced understanding her plan of care

## 2018-09-10 NOTE — MR AVS SNAPSHOT
[address] [facility] [address] 
[phone] Patient: Shamika Barrientos MRN: [id number] QJF:[birth year] Visit Information Date & Time Provider Department Dept. Phone Encounter #  
 9/10/2018  1:30 PM Ana Whitten MD [facility] [phone] [id number] Follow-up Instructions Return in about 6 months (around 3/10/2019) for visit and prolia . Upcoming Health Maintenance Date Due Hepatitis C Screening 1945 DTaP/Tdap/Td series (1 - Tdap) 5/27/1967 FOBT Q 1 YEAR AGE 50-75 5/27/1996 ZOSTER VACCINE AGE 60> 3/27/2006 GLAUCOMA SCREENING Q2Y 5/27/2011 Bone Densitometry (Dexa) Screening 5/27/2011 Pneumococcal 65+ Low/Medium Risk (2 of 2 - PPSV23) 9/16/2017 MEDICARE YEARLY EXAM 3/14/2018 BREAST CANCER SCRN MAMMOGRAM 7/29/2018 Influenza Age 5 to Adult 8/1/2018 Allergies as of 9/10/2018  Review Complete On: 9/10/2018 By: Ana Whitten MD  
  
 Severity Noted Reaction Type Reactions Aspirin  07/18/2017    Nausea Only Ciprofloxacin  07/18/2017    Nausea and Vomiting Codeine  10/03/2016    Itching Ibuprofen  07/18/2017    Nausea and Vomiting Levaquin [Levofloxacin]  07/18/2017    Nausea and Vomiting Macrobid [Nitrofurantoin Monohyd/m-cryst]  07/18/2017    Nausea and Vomiting Morphine  10/03/2016    Nausea and Vomiting Current Immunizations  Never Reviewed No immunizations on file. Not reviewed this visit You Were Diagnosed With   
  
 Codes Comments Senile osteoporosis    -  Primary ICD-10-CM: M81.0 ICD-9-CM: 733.01 Postoperative hypothyroidism     ICD-10-CM: E89.0 ICD-9-CM: 244.0 Vitamin D deficiency     ICD-10-CM: E55.9 ICD-9-CM: 268.9 Vitals BP Pulse Temp Resp Height(growth percentile) Weight(growth percentile) 143/78 (!) 115 97.4 °F (36.3 °C) (Oral) 18 5' 5\" (1.651 m) 148 lb 11.2 oz (67.4 kg) SpO2 BMI OB Status Smoking Status 98% 24.74 kg/m2 Hysterectomy Former Smoker BMI and BSA Data Body Mass Index Body Surface Area 24.74 kg/m 2 1.76 m 2 Preferred Pharmacy Pharmacy Name Phone [id number] [phone] Your Updated Medication List  
  
   
This list is accurate as of 9/10/18  1:56 PM.  Always use your most recent med list.  
  
  
  
  
 albuterol-ipratropium 2.5 mg-0.5 mg/3 ml Nebu Commonly known as:  Daryle Oregon INHALE THE CONTENTS OF 1 VIAL VIA NEB QID PRN  
  
 ALPRAZolam 0.25 mg tablet Commonly known as:  Eulalio January Take 1 Tab by mouth three (3) times daily as needed. ASMANEX  mcg/actuation Hfaa Generic drug:  mometasone ATROVENT HFA 17 mcg/actuation inhaler Generic drug:  ipratropium INL 2 PFS PO Q 4 H; USING BID  
  
 azithromycin 250 mg tablet Commonly known as:  Jamaica Abu Take 1 Tab by mouth daily. denosumab 60 mg/mL injection Commonly known as:  Earl[place] Sportsman 1 mL by SubCUTAneous route once for 1 dose. fluticasone 50 mcg/actuation nasal spray Commonly known as:  Kristi Finely SHAKE WELL AND INSTILL 2 SPRAYS IEN D; AS NEEDED. furosemide 40 mg tablet Commonly known as:  LASIX Take 1 Tab by mouth as needed. gabapentin 100 mg capsule Commonly known as:  NEURONTIN Take 1 Cap by mouth three (3) times daily as needed. HYDROcodone-acetaminophen 5-325 mg per tablet Commonly known as:  Wayna Glaze Take 1 Tab by mouth every four (4) hours as needed for Pain. Max Daily Amount: 6 Tabs. levothyroxine 25 mcg tablet Commonly known as:  SYNTHROID Take 1 Tab by mouth Daily (before breakfast). Monday - Friday one pill. Saturday and Sunday two pills  
  
 montelukast 10 mg tablet Commonly known as:  SINGULAIR TK 1 T PO BED; TAKES DAILY  
  
 morphine 10 mg/5 mL oral solution  
  
 nortriptyline 25 mg capsule Commonly known as:  PAMELOR  
 nystatin 100,000 unit/mL suspension Commonly known as:  MYCOSTATIN  
SHAKE WELL AND TK 5 ML PO QID FOR 7 DAYS Oxygen 2 LITER/NASAL CANNULA;  USES AT NIGHT ONLY. PROBIOTIC 4X 10-15 mg Tbec Generic drug:  B.infantis-B.ani-B.long-B.bifi Take  by mouth daily. SYMBICORT 160-4.5 mcg/actuation Hfaa Generic drug:  budesonide-formoterol * VENTOLIN HFA 90 mcg/actuation inhaler Generic drug:  albuterol INHALE 2 PUFFS Q 4 H PRN FOR WHEEZING  
  
 * albuterol 2.5 mg /3 mL (0.083 %) nebulizer solution Commonly known as:  PROVENTIL VENTOLIN  
by Nebulization route every four (4) hours as needed. * Notice: This list has 2 medication(s) that are the same as other medications prescribed for you. Read the directions carefully, and ask your doctor or other care provider to review them with you. We Performed the Following METABOLIC PANEL, COMPREHENSIVE [73258 CPT(R)] [place] DENOSUMAB INJECTION [ [facility]] THER/PROPH/DIAG INJECTION, SUBCUT/IM [id number] CPT(R)] TSH 3RD GENERATION [75488 CPT(R)] VITAMIN D, 25 HYDROXY [id number] CPT(R)] Follow-up Instructions Return in about 6 months (around 3/10/2019) for visit and prolia . Patient Instructions Please call office if there is any injection site reactions like redness or swelling Call 911 or go to Emergency room if you are feeling dizzy and developing shortness of breath 
 
--------------------------------------------------------------------------------------------------------------------------------------- Synthroid 25  mcg  Daily, and on saturdays and sundays an extra synthroid pill, on empty stomach with water only, no other meds or food or drinks   For next half hour Take any kind of vitamins, calcium, iron   Pills  4 hours later Calcium and vit d Introducing [facility]!    
 [employer] introduces [employer] patient portal. Now you can access parts of your medical record, email your doctor's office, and request medication refills online. 1. In your internet browser, go to [email or web address] 2. Click on the First Time User? Click Here link in the Sign In box. You will see the New Member Sign Up page. 3. Enter your [employer] Access Code exactly as it appears below. You will not need to use this code after youve completed the sign-up process. If you do not sign up before the expiration date, you must request a new code. · [employer] Access Code: [email or web address] Expires: 12/9/2018  1:55 PM 
 
4. Enter the last four digits of your Social Security Number (xxxx) and Date of Birth (mm/dd/yyyy) as indicated and click Submit. You will be taken to the next sign-up page. 5. Create a [employer] ID. This will be your [employer] login ID and cannot be changed, so think of one that is secure and easy to remember. 6. Create a [employer] password. You can change your password at any time. 7. Enter your Password Reset Question and Answer. This can be used at a later time if you forget your password. 8. Enter your e-mail address. You will receive e-mail notification when new information is available in [address] 9. Click Sign Up. You can now view and download portions of your medical record. 10. Click the Download Summary menu link to download a portable copy of your medical information. If you have questions, please visit the Frequently Asked Questions section of the [employer] website. Remember, [employer] is NOT to be used for urgent needs. For medical emergencies, dial 911. Now available from your iPhone and Android! Please provide this summary of care documentation to your next provider. Your primary care clinician is listed as Amrik Benito. If you have any questions after today's visit, please call [phone].

## 2018-09-10 NOTE — PATIENT INSTRUCTIONS
Please call office if there is any injection site reactions like redness or swelling     Call 911 or go to Emergency room if you are feeling dizzy and developing shortness of breath    ---------------------------------------------------------------------------------------------------------------------------------------      Synthroid 25  mcg  Daily, and on saturdays and sundays an extra synthroid pill, on empty stomach with water only, no other meds or food or drinks   For next half hour       Take any kind of vitamins, calcium, iron   Pills  4 hours later      Calcium and vit d

## 2018-09-10 NOTE — PROGRESS NOTES
1. Have you been to the ER, urgent care clinic since your last visit? Hospitalized since your last visit? No    2. Have you seen or consulted any other health care providers outside of the [facility] since your last visit? Include any pap smears or colon screening.  No     Chief Complaint   Patient presents with    Thyroid Problem     follow up    Osteoporosis     patient here for prolia injectifon     Wt Readings from Last 3 Encounters:   09/10/18 148 lb 11.2 oz (67.4 kg)   03/07/18 144 lb (65.3 kg)   08/04/17 132 lb (59.9 kg)     Temp Readings from Last 3 Encounters:   09/10/18 97.4 °F (36.3 °C) (Oral)   03/07/18 97 °F (36.1 °C)   08/05/17 97.6 °F (36.4 °C)     BP Readings from Last 3 Encounters:   09/10/18 143/78   03/07/18 (!) 144/94   08/05/17 141/85     Pulse Readings from Last 3 Encounters:   09/10/18 (!) 115   03/07/18 83   08/05/17 85

## 2018-09-11 LAB
25(OH)D3+25(OH)D2 SERPL-MCNC: 15.9 NG/ML (ref 30–100)
ALBUMIN SERPL-MCNC: 4.3 G/DL (ref 3.5–4.8)
ALBUMIN/GLOB SERPL: 2 {RATIO} (ref 1.2–2.2)
ALP SERPL-CCNC: 43 IU/L (ref 39–117)
ALT SERPL-CCNC: 11 IU/L (ref 0–32)
AST SERPL-CCNC: 16 IU/L (ref 0–40)
BILIRUB SERPL-MCNC: <0.2 MG/DL (ref 0–1.2)
BUN SERPL-MCNC: 20 MG/DL (ref 8–27)
BUN/CREAT SERPL: 27 (ref 12–28)
CALCIUM SERPL-MCNC: 9.8 MG/DL (ref 8.7–10.3)
CHLORIDE SERPL-SCNC: 104 MMOL/L (ref 96–106)
CO2 SERPL-SCNC: 26 MMOL/L (ref 20–29)
CREAT SERPL-MCNC: 0.74 MG/DL (ref 0.57–1)
GLOBULIN SER CALC-MCNC: 2.1 G/DL (ref 1.5–4.5)
GLUCOSE SERPL-MCNC: 93 MG/DL (ref 65–99)
POTASSIUM SERPL-SCNC: 4.2 MMOL/L (ref 3.5–5.2)
PROT SERPL-MCNC: 6.4 G/DL (ref 6–8.5)
SODIUM SERPL-SCNC: 145 MMOL/L (ref 134–144)
TSH SERPL DL<=0.005 MIU/L-ACNC: 3.57 UIU/ML (ref 0.45–4.5)

## 2018-09-11 NOTE — PROGRESS NOTES
Outpatient Occupational Therapy Peds Progress Note  [facility]   Patient Name: Raisa Tay  : 2012  MRN: [id number]  Today's Date: 2018       Visit Date: 2018    Patient Active Problem List   Diagnosis   • Global developmental delay   • Abnormal gait   • Staring spell   • Spastic hemiplegic cerebral palsy (CMS/HCC)   • Partial idiopathic epilepsy with seizures of localized onset, not intractable, without status epilepticus (CMS/HCC)   • Spasticity     Past Medical History:   Diagnosis Date   • Abnormal gait    • Acute otitis media     apparent failed augmentin therapy      • Acute suppurative otitis media of both ears without spontaneous rupture of tympanic membranes    • Acute upper respiratory infection    • Allergic rhinitis    • Contusion of forehead    • Eczema    • Global developmental delay     per [facility]      • Impetigo    • Macular eruption    • Pain in right elbow    • Rash and nonspecific skin eruption    • Rhinitis    • Right arm pain    • Superficial injury of lip    • Upper respiratory infection    • Viral intestinal infection    • Wheezing      Past Surgical History:   Procedure Laterality Date   • STEROID INJECTION  2015    Rocephin (Acute otitis media) (2)       Visit Dx:    ICD-10-CM ICD-9-CM   1. Cerebral palsy, unspecified type (CMS/HCC) G80.9 343.9   2. Global developmental delay F88 315.8                 OT Pediatric Evaluation     Row Name 18 0900             Subjective Comments    Subjective Comments Child brought to therapy by mom and sibling who remained in lobby throughout treatment session.  Mom reports that child has had bad behavior in physical therapy session before OT session.  Mom reports no major changes or concerns  -BD         General Observations/Behavior    General Observations/Behavior Followed verbal directions well;Required physical redirection or verbal cues in order to perform tasks  -BD          She has to increase vit d intake - supplement with drisdol 50,000 int units once every 15 days and to take calcium   Thyroid levels are good Subjective Pain    Subjective Pain Comment No signs or symptoms of pain throughout treatment session  -BD         Motor Control/Motor Learning    Hand Dominance Right  -BD        User Key  (r) = Recorded By, (t) = Taken By, (c) = Cosigned By    Initials Name Provider Type    JENNIFER Tamayo, OTR/L Occupational Therapist                  Therapy Education  Education Details: Gave mom KENDALL therapy worksheets  Given: HEP  Program: New  How Provided: Verbal, Written  Provided to: Caregiver  Level of Understanding: Verbalized        OT Goals     Row Name 08/01/18 0900          OT Short Term Goals    STG 1 Caregiver will be educated in HEP for developmental concerns  -BD     STG 1 Progress Met;Ongoing  -BD     STG 2 Child will demonstrate ability to trace the letters of her first name independently with 75% accuracy  -BD     STG 2 Progress Partially Met;Progressing  -BD     STG 2 Progress Comments 1/3  -BD     STG 3 Child will tie shoe laces off body with min A  -BD     STG 3 Progress Progressing;Partially Met  -BD     STG 3 Progress Comments 2/3  -BD     STG 4 With moderate assistance and cues, child will use adaptive strategies/equipment to transition between activities with less distress and improved attention during play and learning activities 3 out of 4 times.  -BD     STG 4 Progress Met  -BD     STG 5 Child will demonstrate age appropriate self-help skills by thoroughly washing her hands with moderate assistance and moderate verbal cues and also promote balance and bilateral coordination by standing on step stool with min assistance 3 out of 4 attempts.  -BD     STG 5 Progress Met  -BD     STG 5 Progress Comments 3/3  -BD     STG 6 Child will demonstrate ability to complete age-appropriate maze with less than 3 errors independently to improve fine motor precision and hand eye coordination skills  -BD     STG 6 Progress New  -BD        Long Term Goals    LTG 2 Child will imitate a triangle with good form after  demo.  -BD     LTG 2 Progress Progressing;Partially Met  -BD     LTG 3 Caregiver will report compliance with HEP at least 4 out of 7 times per week   -BD     LTG 3 Progress Met;Ongoing  -BD     LTG 4 Child will demonstrate ability to drop and catch ball with both hands 3 out of 5 attempts after visual demonstration to improve bilateral hand coordination skills  -BD     LTG 4 Progress New  -BD     LTG 5 Child will independently use adaptive strategies and special equipment to transition between activities with less distress and improved attention during play and in learning activities 3 out of 4 trials  -BD     LTG 5 Progress Met;Ongoing  -BD     LTG 6 Child will demonstrate age appropriate self help skill by thoroughly washing her hands with minimal verbal cues and also promoting balance in bilateral coordination by standing on step stool with min assist 3 out of 4 attempts  -BD     LTG 6 Progress Met  -BD     LTG 6 Progress Comments 3/3  -BD     LTG 7 Child demonstrate ability to trace name with minimal verbal cues remaining on lines 90% of attempts to improve handwriting skills for ADL and IADL task.  -BD     LTG 7 Progress Partially Met   1/1  -BD     LTG 8 Child will complete simple puzzle independently in 4 out of 5 trials with no verbal cues for increased visual motor and spatial relationship skills  -BD     LTG 8 Progress Met  -BD     LTG 10 Child will imitate a step block design and a pyramid block design independently after demo  -BD     LTG 10 Progress Met  -BD     LTG 10 Progress Comments 3/3  -BD        Time Calculation    OT Goal Re-Cert Due Date 08/31/18  -BD       User Key  (r) = Recorded By, (t) = Taken By, (c) = Cosigned By    Initials Name Provider Type     Judit Tamayo OTR/L Occupational Therapist                OT Assessment/Plan     Row Name 08/01/18 0900 08/01/18 0800       OT Assessment    Functional Limitations Limitations in functional capacity and performance   FM skills, ADL/self  care, suspected sensory deficits, decreased social interaction skills, and the need for continued caregiver education  -BD  --    Impairments Coordination  -BD  --    Assessment Comments Child participated well this date and demonstrated good progression towards overall stated goals.  Child demonstrated significant improvements with grasp and fine motor/visual motor integration skills but struggled this date significantly with BUE coordination at midline for standing ball activities..  Child remains appropriate for skilled occupational therapy services to address these functional deficits.  -BD  --    OT Rehab Potential Good  -BD  --    Patient/caregiver participated in establishment of treatment plan and goals Yes  -BD  --    Patient would benefit from skilled therapy intervention Yes  -BD  --       OT Plan    OT Frequency 1x/week  -BD  --    Predicted Duration of Therapy Intervention (Therapy Eval)  -- 3- 6 months  -MIKE    Planned Therapy Interventions (Optional Details) patient/family education;home exercise program;manual therapy techniques;motor coordination training;strengthening;other (see comments)   Therapeutic exercise, therapeutic activity, ADL/self-care skills, age-appropriate play and social skills and sensory processing and regulation  -BD  --    OT Plan Comments Continue current outpatient OT plan of care with emphasis on BUE coordination at midline  -BD  --      User Key  (r) = Recorded By, (t) = Taken By, (c) = Cosigned By    Initials Name Provider Type    MIKE Cotter, PT Physical Therapist    BD Judit Tamayo OTR/L Occupational Therapist              OT Exercises     Row Name 08/01/18 0900             Exercise 2    Exercise Name 2 tie shoe laces off body   min A for looping lace x 2   -BD      Cueing 2 Verbal;Tactile;Demo;Auditory  -BD         Exercise 4    Exercise Name 4 BUE coordination ax with emphasis on dropping and catching ball with 2 hands    mod frustration noted; max vc and  encouragement provided   -BD      Cueing 4 Verbal;Demo;Auditory;Tactile  -BD         Exercise 5    Exercise Name 5 transition between unwanted and wanted activities to decrease unwanted behaviors    transition well; no outbursts noted   -BD      Cueing 5 Verbal  -BD         Exercise 6    Exercise Name 6 social emotional ax with emphasis on ID feelings    good tolerance; utilized age appropriate words IND   -BD      Cueing 6 Verbal;Auditory  -BD         Exercise 7    Exercise Name 7 intrinsic hand muscle strengthening ax    red putty x 3 min   -BD      Cueing 7 Verbal;Demo;Auditory  -BD         Exercise 8    Exercise Name 8 proprioceptive input through WB and weight shifting on extended arms x 10 attempts    Yaw for positioning at beginning   -BD      Cueing 8 Verbal;Tactile;Auditory  -BD         Exercise 9    Exercise Name 9 copy pyramid/step design    IND   -BD      Cueing 9 Verbal;Demo;Auditory  -BD         Exercise 10    Exercise Name 10 age appropriate maze    remained inside lines 90% IND   -BD      Cueing 10 Verbal;Tactile;Auditory  -BD         Exercise 11    Exercise Name 11 FM precision and integration ax    remained inside lines 75%; filled in 70% of shapes  -BD      Cueing 11 Verbal;Tactile;Demo;Auditory  -BD         Exercise 12    Exercise Name 12 grasp pattern (static tripod grasp)   min A at beginning to position; maintain IND   -BD      Cueing 12 Verbal;Tactile;Auditory  -BD        User Key  (r) = Recorded By, (t) = Taken By, (c) = Cosigned By    Initials Name Provider Type    BD Judit Tamayo OTR/L Occupational Therapist                   Time Calculation:   OT Start Time: 0900  OT Stop Time: 0955  OT Time Calculation (min): 55 min   Therapy Suggested Charges     Code   Minutes Charges    None           Therapy Charges for Today     Code Description Service Date Service Provider Modifiers Qty    08573110491  OT THER PROC EA 15 MIN 8/1/2018 LANEY Vasquez/L GO 2    [id number]  OT  THERAPEUTIC ACT EA 15 MIN 8/1/2018 Judit Tamayo OTR/L GO 2    [id number]  OT THER SUPP EA 15 MIN 8/1/2018 LANEY Vasquez/L GO 1            All therapeutic exercises and activities were chosen to address patient's short term and long term goals.    LANEY Vasquez/CHUCK  8/1/2018

## 2018-09-14 RX ORDER — ERGOCALCIFEROL 1.25 MG/1
50000 CAPSULE ORAL
Qty: 2 CAP | Refills: 6 | Status: SHIPPED | OUTPATIENT
Start: 2018-09-14 | End: 2019-03-20 | Stop reason: SDUPTHER

## 2018-09-14 NOTE — TELEPHONE ENCOUNTER
----- Message from David Fonseca MD sent at 9/11/2018  7:32 PM EDT -----  She has to increase vit d intake - supplement with drisdol 50,000 int units once every 15 days and to take calcium   Thyroid levels are good

## 2018-11-08 RX ORDER — LEVOTHYROXINE SODIUM 25 UG/1
TABLET ORAL
Qty: 108 TAB | Refills: 4 | Status: SHIPPED | OUTPATIENT
Start: 2018-11-08 | End: 2019-03-11 | Stop reason: SDUPTHER

## 2018-11-08 NOTE — TELEPHONE ENCOUNTER
Patient needs refill on her Levothyroxine please do 90 day supply. She will run out tomorrow and has no refills.

## 2019-03-04 ENCOUNTER — TELEPHONE (OUTPATIENT)
Dept: ENDOCRINOLOGY | Age: 74
End: 2019-03-04

## 2019-03-04 ENCOUNTER — HOSPITAL ENCOUNTER (OUTPATIENT)
Dept: LAB | Age: 74
Discharge: HOME | End: 2019-03-04
Payer: MEDICARE

## 2019-03-04 DIAGNOSIS — E04.1 THYROID NODULE: Primary | ICD-10-CM

## 2019-03-04 DIAGNOSIS — M81.0 OSTEOPOROSIS, UNSPECIFIED OSTEOPOROSIS TYPE, UNSPECIFIED PATHOLOGICAL FRACTURE PRESENCE: ICD-10-CM

## 2019-03-04 PROCEDURE — 36415 COLL VENOUS BLD VENIPUNCTURE: CPT

## 2019-03-04 PROCEDURE — 84439 ASSAY OF FREE THYROXINE: CPT

## 2019-03-04 PROCEDURE — 82306 VITAMIN D 25 HYDROXY: CPT

## 2019-03-04 PROCEDURE — 84443 ASSAY THYROID STIM HORMONE: CPT

## 2019-03-04 PROCEDURE — 80053 COMPREHEN METABOLIC PANEL: CPT

## 2019-03-05 LAB
25(OH)D3+25(OH)D2 SERPL-MCNC: 29.4 NG/ML (ref 30–100)
ALBUMIN SERPL-MCNC: 5 G/DL (ref 3.5–4.8)
ALBUMIN/GLOB SERPL: 2.2 {RATIO} (ref 1.2–2.2)
ALP SERPL-CCNC: 42 IU/L (ref 39–117)
ALT SERPL-CCNC: 12 IU/L (ref 0–32)
AST SERPL-CCNC: 19 IU/L (ref 0–40)
BILIRUB SERPL-MCNC: 0.5 MG/DL (ref 0–1.2)
BUN SERPL-MCNC: 16 MG/DL (ref 8–27)
BUN/CREAT SERPL: 28 (ref 12–28)
CALCIUM SERPL-MCNC: 9.4 MG/DL (ref 8.7–10.3)
CHLORIDE SERPL-SCNC: 102 MMOL/L (ref 96–106)
CO2 SERPL-SCNC: 26 MMOL/L (ref 20–29)
CREAT SERPL-MCNC: 0.57 MG/DL (ref 0.57–1)
GLOBULIN SER CALC-MCNC: 2.3 G/DL (ref 1.5–4.5)
GLUCOSE SERPL-MCNC: 83 MG/DL (ref 65–99)
POTASSIUM SERPL-SCNC: 3.8 MMOL/L (ref 3.5–5.2)
PROT SERPL-MCNC: 7.3 G/DL (ref 6–8.5)
SODIUM SERPL-SCNC: 144 MMOL/L (ref 134–144)
T4 FREE SERPL-MCNC: 1.54 NG/DL (ref 0.82–1.77)
TSH SERPL DL<=0.005 MIU/L-ACNC: 2.55 UIU/ML (ref 0.45–4.5)

## 2019-03-11 ENCOUNTER — OFFICE VISIT (OUTPATIENT)
Dept: ENDOCRINOLOGY | Age: 74
End: 2019-03-11

## 2019-03-11 VITALS
TEMPERATURE: 96.9 F | SYSTOLIC BLOOD PRESSURE: 156 MMHG | HEIGHT: 65 IN | BODY MASS INDEX: 22.89 KG/M2 | DIASTOLIC BLOOD PRESSURE: 91 MMHG | RESPIRATION RATE: 18 BRPM | OXYGEN SATURATION: 95 % | WEIGHT: 137.4 LBS | HEART RATE: 85 BPM

## 2019-03-11 DIAGNOSIS — E89.0 POSTOPERATIVE HYPOTHYROIDISM: Primary | ICD-10-CM

## 2019-03-11 DIAGNOSIS — M81.0 OSTEOPOROSIS, UNSPECIFIED OSTEOPOROSIS TYPE, UNSPECIFIED PATHOLOGICAL FRACTURE PRESENCE: ICD-10-CM

## 2019-03-11 RX ORDER — LEVOTHYROXINE SODIUM 25 UG/1
TABLET ORAL
Qty: 108 TAB | Refills: 4 | Status: SHIPPED | OUTPATIENT
Start: 2019-03-11 | End: 2019-09-18

## 2019-03-11 NOTE — PATIENT INSTRUCTIONS
Please call office if there is any injection site reactions like redness or swelling     Call 911 or go to Emergency room if you are feeling dizzy and developing shortness of breath      ---------------------------------------------------------------------------------------------------------------------------------------------------------------------    Synthroid 25  mcg  Daily, and on saturdays and sundays an extra synthroid pill, on empty stomach with water only, no other meds or food or drinks   For next half hour       Take any kind of vitamins, calcium, iron   Pills  4 hours later      --------------------------------------------------------------------------------------------------------------------------      Calcium  500 mg   Twice a day   And    2000 int units daily     Take 5000 int units one cap a week     ( vit d  50,000 international units for every 2 weeks)

## 2019-03-11 NOTE — PROGRESS NOTES
HISTORY OF PRESENT ILLNESS  Sindy Louie is a 68 y.o. female.   HPI  F/u visit after last  visit for  Surgical hypothyroidism  and osteoporosis    From sept 2018       She is here for prolia shot     She is diagnosed with chronic aggressive neuropathy   She has balance issues     She uses a cane   She has not started on vit d     Compliant on synthroid           Old history     She had thyroidectomy, right -  Aug 2017 by dr. Dionisio Roberts , benign she says  She never went back to follow up   [place] [address] [place]       She recovered well without post op complications   She is feeling extremely tired         Prior history :   She has respiratory issues - COPD for number of years   She f/u at [facility]       She has had usg and Ct neck which showed right thyroid nodularity   She has been taking low dose synthroid  25 mcg a day     She has swallowing difficulties recently     She was found to have very low vit d - 10     She had a fracture on right humerus - march 2016   She is going in for DEXA in oct 2016     No personal h/o cancers, radiation treatments         Past Medical History:   Diagnosis Date    Anxiety     Arthritis     osteo    Back pain     Chronic pain     back, shoulders, pelvis    COPD (chronic obstructive pulmonary disease) ([facility] [address]     Diverticulitis     Dizziness     Gastritis     Hepatitis B     Hernia     hiatal    Multinodular goiter 7/18/2017    Rheumatic fever        Social History     Socioeconomic History    Marital status: SINGLE     Spouse name: Not on file    Number of children: Not on file    Years of education: Not on file    Highest education level: Not on file   Social Needs    Financial resource strain: Not on file    Food insecurity - worry: Not on file    Food insecurity - inability: Not on file   [employer] needs - medical: Not on file   [employer] needs - non-medical: Not on file   Occupational History    Not on file   Tobacco Use    Smoking status: Former Smoker     Packs/day: 1.00     Years: 30.00     Pack years: 30.00     Last attempt to quit: 2010     Years since quittin.6    Smokeless tobacco: Never Used   Substance and Sexual Activity    Alcohol use: Yes     Comment: seldom    Drug use: No    Sexual activity: No   Other Topics Concern    Not on file   Social History Narrative    Not on file       Family History   Problem Relation Age of Onset    Heart Disease Mother     COPD Sister     Other Father         ANEURYSM    Anesth Problems Neg Hx                Review of Systems   Constitutional: Positive for malaise/fatigue. HENT: Negative. Eyes: Negative for pain and redness. Respiratory: Negative. Cardiovascular: Negative for chest pain, palpitations and leg swelling. Gastrointestinal: Negative. Negative for constipation. Genitourinary: Negative. Musculoskeletal: Negative for myalgias. Skin: Negative. Neurological: Positive for dizziness. Endo/Heme/Allergies: Negative. Psychiatric/Behavioral: Negative for depression and memory loss. The patient does not have insomnia. Physical Exam   Constitutional: She is oriented to person, place, and time. She appears well-developed and well-nourished. HENT:   Head: Normocephalic. Eyes: Conjunctivae and EOM are normal. Pupils are equal, round, and reactive to light. Neck: Normal range of motion. Neck supple. No JVD present. No tracheal deviation present. Healed surgical scar  present. Cardiovascular: Normal rate, regular rhythm and normal heart sounds. No murmur heard. Pulmonary/Chest: Breath sounds normal.   Abdominal: Soft. Bowel sounds are normal.   Musculoskeletal: Normal range of motion. kyphotic spine   Lymphadenopathy:     She has no cervical adenopathy. Neurological: She is alert and oriented to person, place, and time. She has normal reflexes. Skin: Skin is warm. Psychiatric: She has a normal mood and affect.        LAbs - TSh is 1.540 ASSESSMENT and PLAN    1. Hypothyroidism : on synthroid 25 mcg  A day  S/p right thyroid lobectomy , benign   Nodule on surg path - August 2017         2. Osteoporosis :  S/p right humerus fracture from standing height fall  April 2016   On steroids on and off  prednisone 20 mg a day   Date : oct 6 2016   Bone DEXA  ap spine T-score -2.9 ( L3 is -3.4) ; left femoral Neck T- score  -2.5, right femoral neck T-score n/a  She has severe vit d def   Started her on daily forteo  With sample pen as  she has severe osteoporosis   She stopped it within a month     She is interested in taking prolia -   Started on it first dose March 2018 and getting third shot today     Due for DEXA in  oct 2018 - pushing it to march 2019       3.  COPD - right sided HTN  Uses lasix sparingly        > 50 % of time is spent on counseling   Patient voiced understanding her plan of care

## 2019-03-11 NOTE — PROGRESS NOTES
Prolia Injection administered in Left Arm. Patient tolerated well. No c/o pain voiced.   [address] [place] [address] Way  Exp: 06/2021  Lot # [id number]  NDC# [id number]

## 2019-03-11 NOTE — PROGRESS NOTES
1. Have you been to the ER, urgent care clinic since your last visit? Hospitalized since your last visit? No    2. Have you seen or consulted any other health care providers outside of the [address] [place] [address] since your last visit? Include any pap smears or colon screening. No     Chief Complaint   Patient presents with    Osteoporosis     patient here for Mallissa Anshul injection/follow up    Thyroid Problem     follow up     Learning assessment complete  Abuse Screening Questionnaire 3/11/2019   Do you ever feel afraid of your partner? N   Are you in a relationship with someone who physically or mentally threatens you? N   Is it safe for you to go home?  Y     Wt Readings from Last 3 Encounters:   03/11/19 137 lb 6.4 oz (62.3 kg)   09/10/18 148 lb 11.2 oz (67.4 kg)   03/07/18 144 lb (65.3 kg)     Temp Readings from Last 3 Encounters:   03/11/19 96.9 °F (36.1 °C) (Oral)   09/10/18 97.4 °F (36.3 °C) (Oral)   03/07/18 97 °F (36.1 °C)     BP Readings from Last 3 Encounters:   03/11/19 (!) 156/91   09/10/18 143/78   03/07/18 (!) 144/94     Pulse Readings from Last 3 Encounters:   03/11/19 85   09/10/18 (!) 115   03/07/18 83

## 2019-03-20 RX ORDER — ERGOCALCIFEROL 1.25 MG/1
CAPSULE ORAL
Qty: 2 CAP | Refills: 6 | Status: SHIPPED | OUTPATIENT
Start: 2019-03-20 | End: 2019-09-18 | Stop reason: ALTCHOICE

## 2019-09-11 ENCOUNTER — HOSPITAL ENCOUNTER (OUTPATIENT)
Dept: LAB | Age: 74
Discharge: HOME | End: 2019-09-11
Payer: MEDICARE

## 2019-09-11 DIAGNOSIS — M81.0 OSTEOPOROSIS, UNSPECIFIED OSTEOPOROSIS TYPE, UNSPECIFIED PATHOLOGICAL FRACTURE PRESENCE: ICD-10-CM

## 2019-09-11 DIAGNOSIS — E89.0 POSTOPERATIVE HYPOTHYROIDISM: ICD-10-CM

## 2019-09-11 PROCEDURE — 84443 ASSAY THYROID STIM HORMONE: CPT

## 2019-09-11 PROCEDURE — 36415 COLL VENOUS BLD VENIPUNCTURE: CPT

## 2019-09-11 PROCEDURE — 80053 COMPREHEN METABOLIC PANEL: CPT

## 2019-09-11 PROCEDURE — 85025 COMPLETE CBC W/AUTO DIFF WBC: CPT

## 2019-09-12 LAB
ALBUMIN SERPL-MCNC: 4.7 G/DL (ref 3.5–4.8)
ALBUMIN/GLOB SERPL: 2.2 {RATIO} (ref 1.2–2.2)
ALP SERPL-CCNC: 38 IU/L (ref 39–117)
ALT SERPL-CCNC: 15 IU/L (ref 0–32)
AST SERPL-CCNC: 15 IU/L (ref 0–40)
BASOPHILS # BLD AUTO: 0.1 X10E3/UL (ref 0–0.2)
BASOPHILS NFR BLD AUTO: 1 %
BILIRUB SERPL-MCNC: 0.5 MG/DL (ref 0–1.2)
BUN SERPL-MCNC: 13 MG/DL (ref 8–27)
BUN/CREAT SERPL: 19 (ref 12–28)
CALCIUM SERPL-MCNC: 10.1 MG/DL (ref 8.7–10.3)
CHLORIDE SERPL-SCNC: 103 MMOL/L (ref 96–106)
CO2 SERPL-SCNC: 28 MMOL/L (ref 20–29)
CREAT SERPL-MCNC: 0.7 MG/DL (ref 0.57–1)
EOSINOPHIL # BLD AUTO: 0.2 X10E3/UL (ref 0–0.4)
EOSINOPHIL NFR BLD AUTO: 3 %
ERYTHROCYTE [DISTWIDTH] IN BLOOD BY AUTOMATED COUNT: 14.3 % (ref 12.3–15.4)
GLOBULIN SER CALC-MCNC: 2.1 G/DL (ref 1.5–4.5)
GLUCOSE SERPL-MCNC: 89 MG/DL (ref 65–99)
HCT VFR BLD AUTO: 40.6 % (ref 34–46.6)
HGB BLD-MCNC: 13.4 G/DL (ref 11.1–15.9)
IMM GRANULOCYTES # BLD AUTO: 0 X10E3/UL (ref 0–0.1)
IMM GRANULOCYTES NFR BLD AUTO: 0 %
LYMPHOCYTES # BLD AUTO: 2.6 X10E3/UL (ref 0.7–3.1)
LYMPHOCYTES NFR BLD AUTO: 36 %
MCH RBC QN AUTO: 29.9 PG (ref 26.6–33)
MCHC RBC AUTO-ENTMCNC: 33 G/DL (ref 31.5–35.7)
MCV RBC AUTO: 91 FL (ref 79–97)
MONOCYTES # BLD AUTO: 0.6 X10E3/UL (ref 0.1–0.9)
MONOCYTES NFR BLD AUTO: 8 %
NEUTROPHILS # BLD AUTO: 3.8 X10E3/UL (ref 1.4–7)
NEUTROPHILS NFR BLD AUTO: 52 %
PLATELET # BLD AUTO: 238 X10E3/UL (ref 150–450)
POTASSIUM SERPL-SCNC: 4.4 MMOL/L (ref 3.5–5.2)
PROT SERPL-MCNC: 6.8 G/DL (ref 6–8.5)
RBC # BLD AUTO: 4.48 X10E6/UL (ref 3.77–5.28)
SODIUM SERPL-SCNC: 145 MMOL/L (ref 134–144)
TSH SERPL DL<=0.005 MIU/L-ACNC: 6.45 UIU/ML (ref 0.45–4.5)
WBC # BLD AUTO: 7.2 X10E3/UL (ref 3.4–10.8)

## 2019-09-18 ENCOUNTER — OFFICE VISIT (OUTPATIENT)
Dept: ENDOCRINOLOGY | Age: 74
End: 2019-09-18

## 2019-09-18 VITALS
WEIGHT: 131.5 LBS | OXYGEN SATURATION: 96 % | RESPIRATION RATE: 18 BRPM | TEMPERATURE: 97.5 F | BODY MASS INDEX: 21.91 KG/M2 | SYSTOLIC BLOOD PRESSURE: 151 MMHG | HEART RATE: 95 BPM | DIASTOLIC BLOOD PRESSURE: 97 MMHG | HEIGHT: 65 IN

## 2019-09-18 DIAGNOSIS — E89.0 POSTOPERATIVE HYPOTHYROIDISM: ICD-10-CM

## 2019-09-18 DIAGNOSIS — M81.0 SENILE OSTEOPOROSIS: Primary | ICD-10-CM

## 2019-09-18 RX ORDER — LEVOTHYROXINE SODIUM 25 UG/1
TABLET ORAL
Qty: 108 TAB | Refills: 4 | Status: SHIPPED | OUTPATIENT
Start: 2019-09-18 | End: 2020-07-10

## 2019-09-18 NOTE — PROGRESS NOTES
HISTORY OF PRESENT ILLNESS  Yulisa Barba is a 69 y.o. female.   HPI  F/u visit after last  visit for  Surgical hypothyroidism  and osteoporosis ,  From [facility] 2019       She has been having many falls   She had fractured also    Not taking synthroid as prescribed  She says she cannot move around             Old history  :     She is here for prolia shot     She is diagnosed with chronic aggressive neuropathy   She has balance issues     She uses a cane   She has not started on vit d     Compliant on synthroid           Old history     She had thyroidectomy, right -  Aug 2017 by dr. Eyal Jefferson , benign she says  She never went back to follow up   [place]       She recovered well without post op complications   She is feeling extremely tired         Prior history :   She has respiratory issues - COPD for number of years   She f/u at [facility]       She has had usg and Ct neck which showed right thyroid nodularity   She has been taking low dose synthroid  25 mcg a day     She has swallowing difficulties recently     She was found to have very low vit d - 10     She had a fracture on right humerus - march 2016   She is going in for DEXA in oct 2016     No personal h/o cancers, radiation treatments         Past Medical History:   Diagnosis Date    Anxiety     Arthritis     osteo    Back pain     Chronic pain     back, shoulders, pelvis    COPD [facility]chronic obstructive pulmonary disease) ([facility] [address]     Diverticulitis     Dizziness     Gastritis     Hepatitis B     Hernia     hiatal    Multinodular goiter 7/18/2017    Rheumatic fever        Social History     Socioeconomic History    Marital status: SINGLE     Spouse name: Not on file    Number of children: Not on file    Years of education: Not on file    Highest education level: Not on file   Occupational History    Not on file   Social Needs    Financial resource strain: Not on file    Food insecurity:     Worry: Not on file     Inability: Not on file   Whitepages needs:     Medical: Not on file     Non-medical: Not on file   Tobacco Use    Smoking status: Former Smoker     Packs/day: 1.00     Years: 30.00     Pack years: 30.00     Last attempt to quit: 2010     Years since quittin.1    Smokeless tobacco: Never Used   Substance and Sexual Activity    Alcohol use: Yes     Comment: seldom    Drug use: No    Sexual activity: Never   Lifestyle    Physical activity:     Days per week: Not on file     Minutes per session: Not on file    Stress: Not on file   Relationships    Social connections:     Talks on phone: Not on file     Gets together: Not on file     Attends Zoroastrianism service: Not on file     Active member of club or organization: Not on file     Attends meetings of clubs or organizations: Not on file     Relationship status: Not on file    Intimate partner violence:     Fear of current or ex partner: Not on file     Emotionally abused: Not on file     Physically abused: Not on file     Forced sexual activity: Not on file   Other Topics Concern    Not on file   Social History Narrative    Not on file       Family History   Problem Relation Age of Onset    Heart Disease Mother     COPD Sister     Other Father         ANEURYSM    Anesth Problems Neg Hx                Review of Systems   Constitutional: Positive for malaise/fatigue. HENT: Negative. Eyes: Negative for pain and redness. Respiratory: Negative. Cardiovascular: Negative for chest pain, palpitations and leg swelling. Gastrointestinal: Negative. Negative for constipation. Genitourinary: Negative. Musculoskeletal: Negative for myalgias. Skin: Negative. Neurological: Positive for dizziness. Endo/Heme/Allergies: Negative. Psychiatric/Behavioral: Negative for depression and memory loss. The patient does not have insomnia. Physical Exam   Constitutional: She is oriented to person, place, and time.  She appears well-developed and well-nourished. HENT:   Head: Normocephalic. Eyes: Conjunctivae and EOM are normal. Pupils are equal, round, and reactive to light. Neck: Normal range of motion. Neck supple. No JVD present. No tracheal deviation present. Healed surgical scar  present. Cardiovascular: Normal rate, regular rhythm and normal heart sounds. No murmur heard. Pulmonary/Chest: Breath sounds normal.   Abdominal: Soft. Bowel sounds are normal.   Musculoskeletal: Normal range of motion. kyphotic spine   Lymphadenopathy:     She has no cervical adenopathy. Neurological: She is alert and oriented to person, place, and time. She has normal reflexes. Skin: Skin is warm. Psychiatric: She has a normal mood and affect. Lab Results   Component Value Date/Time    GFR est non-AA 86 09/11/2019 02:48 PM    GFR est AA 99 09/11/2019 02:48 PM    Creatinine 0.70 09/11/2019 02:48 PM    BUN 13 09/11/2019 02:48 PM    Sodium 145 (H) 09/11/2019 02:48 PM    Potassium 4.4 09/11/2019 02:48 PM    Chloride 103 09/11/2019 02:48 PM    CO2 28 09/11/2019 02:48 PM     Lab Results   Component Value Date/Time    TSH 6.450 (H) 09/11/2019 02:48 PM    T4, Free 1.54 03/04/2019 11:18 AM              ASSESSMENT and PLAN    1. Hypothyroidism : on synthroid 25 mcg  A day  S/p right thyroid lobectomy , benign   Nodule on surg path - August 2017     TSH is over 6         2. Osteoporosis :  S/p right humerus fracture from standing height fall  April 2016   On steroids on and off  prednisone 20 mg a day   Date : oct 6 2016   Bone DEXA  ap spine T-score -2.9 ( L3 is -3.4) ; left femoral Neck T- score  -2.5, right femoral neck T-score n/a  She has severe vit d def   Started her on daily forteo  With sample pen as  she has severe osteoporosis   She stopped it within a month     Started on it first dose March 2018 and got third shot march 2019         3.  COPD - right sided HTN  Uses lasix sparingly        > 50 % of time is spent on counseling   Patient voiced understanding her plan of care

## 2019-09-18 NOTE — PROGRESS NOTES
1. Have you been to the ER, urgent care clinic since your last visit? No  Hospitalized since your last visit? No    2. Have you seen or consulted any other health care providers outside of the [address] [place] [address] since your last visit? Include any pap smears or colon screening.  No       Wt Readings from Last 3 Encounters:   09/18/19 131 lb 8 oz (59.6 kg)   03/11/19 137 lb 6.4 oz (62.3 kg)   09/10/18 148 lb 11.2 oz (67.4 kg)     Temp Readings from Last 3 Encounters:   09/18/19 97.5 °F (36.4 °C) (Oral)   03/11/19 96.9 °F (36.1 °C) (Oral)   09/10/18 97.4 °F (36.3 °C) (Oral)     BP Readings from Last 3 Encounters:   09/18/19 (!) 151/97   03/11/19 (!) 156/91   09/10/18 143/78     Pulse Readings from Last 3 Encounters:   09/18/19 95   03/11/19 85   09/10/18 (!) 115

## 2019-09-18 NOTE — PATIENT INSTRUCTIONS
Synthroid 25 mcg  One pill daily but on  Saturday and Sunday take  2 pills,  on empty stomach with water only, no other meds or food or drinks   For next half hour Take any kind of vitamins, calcium, iron   Pills  4 hours later 
 
 
------------------------------------------------------------------------------------------------------------------------------------------------------- Please call office if there is any injection site reactions like redness or swelling Call 911 or go to Emergency room if you are feeling dizzy and developing shortness of breath

## 2020-07-10 ENCOUNTER — HOSPITAL ENCOUNTER (OUTPATIENT)
Dept: PREADMISSION TESTING | Age: 75
Discharge: HOME | End: 2020-07-10
Payer: MEDICARE

## 2020-07-10 VITALS
BODY MASS INDEX: 22.41 KG/M2 | SYSTOLIC BLOOD PRESSURE: 119 MMHG | HEIGHT: 65 IN | TEMPERATURE: 98.7 F | HEART RATE: 90 BPM | WEIGHT: 134.48 LBS | DIASTOLIC BLOOD PRESSURE: 77 MMHG

## 2020-07-10 DIAGNOSIS — U07.1 COVID-19: ICD-10-CM

## 2020-07-10 LAB
ALBUMIN SERPL-MCNC: 3.6 G/DL (ref 3.5–5)
ALBUMIN/GLOB SERPL: 1.2 {RATIO} (ref 1.1–2.2)
ALP SERPL-CCNC: 53 U/L (ref 45–117)
ALT SERPL-CCNC: 19 U/L (ref 12–78)
ANION GAP SERPL CALC-SCNC: 6 MMOL/L (ref 5–15)
APPEARANCE UR: CLEAR
AST SERPL-CCNC: 8 U/L (ref 15–37)
BACTERIA URNS QL MICRO: NEGATIVE /HPF
BASOPHILS # BLD: 0.1 K/UL (ref 0–0.1)
BASOPHILS NFR BLD: 1 % (ref 0–1)
BILIRUB SERPL-MCNC: 0.3 MG/DL (ref 0.2–1)
BILIRUB UR QL: NEGATIVE
BUN SERPL-MCNC: 22 MG/DL (ref 6–20)
BUN/CREAT SERPL: 37 (ref 12–20)
CALCIUM SERPL-MCNC: 9.2 MG/DL (ref 8.5–10.1)
CHLORIDE SERPL-SCNC: 106 MMOL/L (ref 97–108)
CO2 SERPL-SCNC: 27 MMOL/L (ref 21–32)
COLOR UR: NORMAL
CREAT SERPL-MCNC: 0.59 MG/DL (ref 0.55–1.02)
DIFFERENTIAL METHOD BLD: ABNORMAL
EOSINOPHIL # BLD: 0.8 K/UL (ref 0–0.4)
EOSINOPHIL NFR BLD: 9 % (ref 0–7)
EPITH CASTS URNS QL MICRO: NORMAL /LPF
ERYTHROCYTE [DISTWIDTH] IN BLOOD BY AUTOMATED COUNT: 12.8 % (ref 11.5–14.5)
GLOBULIN SER CALC-MCNC: 3.1 G/DL (ref 2–4)
GLUCOSE SERPL-MCNC: 89 MG/DL (ref 65–100)
GLUCOSE UR STRIP.AUTO-MCNC: NEGATIVE MG/DL
HCT VFR BLD AUTO: 39.6 % (ref 35–47)
HGB BLD-MCNC: 12.4 G/DL (ref 11.5–16)
HGB UR QL STRIP: NEGATIVE
IMM GRANULOCYTES # BLD AUTO: 0 K/UL (ref 0–0.04)
IMM GRANULOCYTES NFR BLD AUTO: 0 % (ref 0–0.5)
KETONES UR QL STRIP.AUTO: NEGATIVE MG/DL
LEUKOCYTE ESTERASE UR QL STRIP.AUTO: NEGATIVE
LYMPHOCYTES # BLD: 2.5 K/UL (ref 0.8–3.5)
LYMPHOCYTES NFR BLD: 29 % (ref 12–49)
MCH RBC QN AUTO: 29.4 PG (ref 26–34)
MCHC RBC AUTO-ENTMCNC: 31.3 G/DL (ref 30–36.5)
MCV RBC AUTO: 93.8 FL (ref 80–99)
MONOCYTES # BLD: 0.7 K/UL (ref 0–1)
MONOCYTES NFR BLD: 8 % (ref 5–13)
NEUTS SEG # BLD: 4.6 K/UL (ref 1.8–8)
NEUTS SEG NFR BLD: 53 % (ref 32–75)
NITRITE UR QL STRIP.AUTO: NEGATIVE
NRBC # BLD: 0 K/UL (ref 0–0.01)
NRBC BLD-RTO: 0 PER 100 WBC
PH UR STRIP: 6.5 [PH] (ref 5–8)
PLATELET # BLD AUTO: 245 K/UL (ref 150–400)
PMV BLD AUTO: 10.8 FL (ref 8.9–12.9)
POTASSIUM SERPL-SCNC: 3.9 MMOL/L (ref 3.5–5.1)
PROT SERPL-MCNC: 6.7 G/DL (ref 6.4–8.2)
PROT UR STRIP-MCNC: NEGATIVE MG/DL
RBC # BLD AUTO: 4.22 M/UL (ref 3.8–5.2)
RBC #/AREA URNS HPF: NORMAL /HPF (ref 0–5)
SODIUM SERPL-SCNC: 139 MMOL/L (ref 136–145)
SP GR UR REFRACTOMETRY: 1.02 (ref 1–1.03)
UA: UC IF INDICATED,UAUC: NORMAL
UROBILINOGEN UR QL STRIP.AUTO: 1 EU/DL (ref 0.2–1)
WBC # BLD AUTO: 8.7 K/UL (ref 3.6–11)
WBC URNS QL MICRO: NORMAL /HPF (ref 0–4)

## 2020-07-10 PROCEDURE — 85025 COMPLETE CBC W/AUTO DIFF WBC: CPT

## 2020-07-10 PROCEDURE — 87635 SARS-COV-2 COVID-19 AMP PRB: CPT

## 2020-07-10 PROCEDURE — 36415 COLL VENOUS BLD VENIPUNCTURE: CPT

## 2020-07-10 PROCEDURE — 80053 COMPREHEN METABOLIC PANEL: CPT

## 2020-07-10 PROCEDURE — 93005 ELECTROCARDIOGRAM TRACING: CPT

## 2020-07-10 PROCEDURE — 81001 URINALYSIS AUTO W/SCOPE: CPT

## 2020-07-10 RX ORDER — LEVOTHYROXINE SODIUM 25 UG/1
25 TABLET ORAL
COMMUNITY

## 2020-07-10 RX ORDER — OXYCODONE HYDROCHLORIDE 5 MG/1
5 TABLET ORAL
COMMUNITY

## 2020-07-10 RX ORDER — AMOXICILLIN 500 MG/1
500 CAPSULE ORAL AS NEEDED
COMMUNITY

## 2020-07-10 RX ORDER — PREDNISONE 20 MG/1
TABLET ORAL
COMMUNITY

## 2020-07-10 RX ORDER — LEVOTHYROXINE SODIUM 25 UG/1
50 TABLET ORAL
COMMUNITY

## 2020-07-10 NOTE — PERIOP NOTES
PREOPERATIVE INSTRUCTIONS REVIEWED WITH PATIENT. PATIENT GIVEN TWO-- BOTTLES OF CHG SOAPS  INSTRUCTIONS REVIEWED ON USE OF CHG SOAPS   PATIENT GIVEN SSI INFECTIONS SHEET. PATIENT WAS GIVEN THE OPPORTUNITY TO ASK QUESTIONS ON THE INFORMATION PROVIDED.       DR RODRIGUEZ`S OFFICE

## 2020-07-11 LAB
ATRIAL RATE: 100 BPM
CALCULATED P AXIS, ECG09: 77 DEGREES
CALCULATED R AXIS, ECG10: 52 DEGREES
CALCULATED T AXIS, ECG11: 66 DEGREES
DIAGNOSIS, 93000: NORMAL
P-R INTERVAL, ECG05: 142 MS
Q-T INTERVAL, ECG07: 322 MS
QRS DURATION, ECG06: 78 MS
QTC CALCULATION (BEZET), ECG08: 415 MS
SARS-COV-2, COV2NT: NOT DETECTED
VENTRICULAR RATE, ECG03: 100 BPM

## 2020-07-13 NOTE — H&P
CARE TEAM:  Patient Care Team:  Abundio Souza MD as PCP - General (Family Medicine)     ASSESSMENT:  1. Other closed intra-articular fracture of distal end of left radius, initial encounter          There is no problem list on file for this patient.       PLAN:  Treatment Plan:  I recommend operative management. ORIF. We discussed reasonable expectations. We discussed usual postoperative course. She has given informed consent to proceed. Strict ice and elevation in the interim.         Orders Placed This Encounter    Cast application    oxyCODONE (ROXICODONE) 5 MG immediate release tablet      Follow-up: Return for first postoperative visit.      HISTORY OF PRESENT ILLNESS:  Chief Complaint:  Left wrist pain and swelling     Age: 77 y.o. Sex: female   History of present illness: Cassandra hernandez pleasant 77 y.o. female who presents today for evaluation left wrist injury. Her injury was 7/6/2020. She had a trip and fall in her restroom. Immediate onset of pain and deformity of the left wrist.  Seen at the [facility] emergency room. Diagnosed with a fracture. Placed into a splint. She has been on oxycodone. She has not been icing. Complains of significant pain. No numbness     Past medical history, past surgical history, medications, allergies, social history, and review of systems have been reviewed by me. No current facility-administered medications on file prior to encounter. Current Outpatient Medications on File Prior to Encounter   Medication Sig Dispense Refill    nortriptyline (PAMELOR) 25 mg capsule       morphine 10 mg/5 mL oral solution every four (4) hours as needed.  SYMBICORT 160-4.5 mcg/actuation HFAA       Oxygen 3LITER/NASAL CANNULA;  USES AT NIGHT ONLY.  albuterol (PROVENTIL VENTOLIN) 2.5 mg /3 mL (0.083 %) nebulizer solution by Nebulization route every four (4) hours as needed.   11    VENTOLIN HFA 90 mcg/actuation inhaler INHALE 2 PUFFS Q 4 H PRN FOR WHEEZING  10    azithromycin (ZITHROMAX) 250 mg tablet Take 1 Tab by mouth daily. 3    ALPRAZolam (XANAX) 0.25 mg tablet Take 1 Tab by mouth three (3) times daily as needed. 0    fluticasone (FLONASE) 50 mcg/actuation nasal spray SHAKE WELL AND INSTILL 2 SPRAYS IEN D; AS NEEDED.  2    furosemide (LASIX) 40 mg tablet Take 1 Tab by mouth as needed. 3    ATROVENT HFA 17 mcg/actuation inhaler INL 2 PFS PO Q 4 H; USING BID  11    albuterol-ipratropium (DUO-NEB) 2.5 mg-0.5 mg/3 ml nebu INHALE THE CONTENTS OF 1 VIAL VIA NEB QID PRN  11    montelukast (SINGULAIR) 10 mg tablet nightly. 3    nystatin (MYCOSTATIN) 100,000 unit/mL suspension as needed.   1       Past Medical History:   Diagnosis Date    Anxiety     Arthritis     osteo    Asthma     Back pain     Chronic pain     back, shoulders, pelvis    COPD (chronic obstructive pulmonary disease) (HCC)     Diverticulitis     Dizziness     Gastritis     Hepatitis B     Hernia     hiatal    Ill-defined condition     ON OXYGEN 3L     Multinodular goiter 7/18/2017    Neuropathy     Rheumatic fever        Allergies   Allergen Reactions    Aspirin Nausea Only    Ciprofloxacin Nausea and Vomiting    Codeine Itching    Ibuprofen Nausea and Vomiting    Levaquin [Levofloxacin] Nausea and Vomiting    Macrobid [Nitrofurantoin Monohyd/M-Cryst] Nausea and Vomiting    Morphine Nausea and Vomiting       Social History     Socioeconomic History    Marital status: SINGLE     Spouse name: Not on file    Number of children: Not on file    Years of education: Not on file    Highest education level: Not on file   Occupational History    Not on file   Social Needs    Financial resource strain: Not on file    Food insecurity     Worry: Not on file     Inability: Not on file    Transportation needs     Medical: Not on file     Non-medical: Not on file   Tobacco Use    Smoking status: Former Smoker     Packs/day: 1.00     Years: 30.00     Pack years: 30.00 Last attempt to quit: 2010     Years since quittin.9    Smokeless tobacco: Never Used   Substance and Sexual Activity    Alcohol use: Yes     Comment: seldom    Drug use: No    Sexual activity: Never   Lifestyle    Physical activity     Days per week: Not on file     Minutes per session: Not on file    Stress: Not on file   Relationships    Social connections     Talks on phone: Not on file     Gets together: Not on file     Attends Caodaism service: Not on file     Active member of club or organization: Not on file     Attends meetings of clubs or organizations: Not on file     Relationship status: Not on file    Intimate partner violence     Fear of current or ex partner: Not on file     Emotionally abused: Not on file     Physically abused: Not on file     Forced sexual activity: Not on file   Other Topics Concern    Not on file   Social History Narrative    Not on file          Review of Systems   2020    Constitutional: Unexplained: Negative  Genitourinary: Frequent Urination: Negative  HEENT: Vision Loss: Negative  Neurological: Memory Loss: Positive  Integumentary: Rash: Negative  Cardiovascular: Palpatations: Negative  Hematologic: Bruises/Bleeds Easily: Positive  Gastrointestinal: Constipation: Positive  Immunological: Seasonal Allergies: Negative  Musculoskeletal: Joint Pain: Positive        OBJECTIVE:  Constitutional:  No acute distress. Pleasant and cooperative with exam.  HEENT: Normocephalic. Atraumatic. Eyes are clear  Hearing intact to spoken word   Respiratory:  Breathing unlabored. .  Cardiovascular:  No marked edema. Psychiatric: Alert.  Affect appropriate. Gait: Normal.  Musculoskeletal    Skin is intact. Her splint was removed. She has obvious deformity of the left wrist.  Significant swelling. Significant ecchymoses. Intact sensibility     IMAGING / STUDIES:           No imaging obtained       Independently reviewed her x-ray images.   She has a comminuted displaced intra-articular fracture of the distal radius

## 2020-07-14 ENCOUNTER — ANESTHESIA (OUTPATIENT)
Dept: SURGERY | Age: 75
End: 2020-07-14
Payer: MEDICARE

## 2020-07-14 ENCOUNTER — HOSPITAL ENCOUNTER (OUTPATIENT)
Age: 75
Setting detail: OUTPATIENT SURGERY
Discharge: HOME | End: 2020-07-14
Attending: ORTHOPAEDIC SURGERY | Admitting: ORTHOPAEDIC SURGERY
Payer: MEDICARE

## 2020-07-14 ENCOUNTER — ANESTHESIA EVENT (OUTPATIENT)
Dept: SURGERY | Age: 75
End: 2020-07-14
Payer: MEDICARE

## 2020-07-14 VITALS
BODY MASS INDEX: 22.41 KG/M2 | OXYGEN SATURATION: 100 % | SYSTOLIC BLOOD PRESSURE: 135 MMHG | HEIGHT: 65 IN | HEART RATE: 75 BPM | RESPIRATION RATE: 12 BRPM | TEMPERATURE: 97.6 F | WEIGHT: 134.48 LBS | DIASTOLIC BLOOD PRESSURE: 78 MMHG

## 2020-07-14 PROCEDURE — C1713 ANCHOR/SCREW BN/BN,TIS/BN: HCPCS | Performed by: ORTHOPAEDIC SURGERY

## 2020-07-14 PROCEDURE — 74011000250 HC RX REV CODE- 250: Performed by: NURSE ANESTHETIST, CERTIFIED REGISTERED

## 2020-07-14 PROCEDURE — 77030040922 HC BLNKT HYPOTHRM STRY -A

## 2020-07-14 PROCEDURE — 74011250636 HC RX REV CODE- 250/636: Performed by: NURSE ANESTHETIST, CERTIFIED REGISTERED

## 2020-07-14 PROCEDURE — 77030002933 HC SUT MCRYL J&J -A: Performed by: ORTHOPAEDIC SURGERY

## 2020-07-14 PROCEDURE — 74011250636 HC RX REV CODE- 250/636: Performed by: ORTHOPAEDIC SURGERY

## 2020-07-14 PROCEDURE — 76060000032 HC ANESTHESIA 0.5 TO 1 HR: Performed by: ORTHOPAEDIC SURGERY

## 2020-07-14 PROCEDURE — 77030035485 HC BIT DRL DVR XLOK QC BIOM -C: Performed by: ORTHOPAEDIC SURGERY

## 2020-07-14 PROCEDURE — 77030039497 HC CST PAD STERILE CHCS -A: Performed by: ORTHOPAEDIC SURGERY

## 2020-07-14 PROCEDURE — 76210000021 HC REC RM PH II 0.5 TO 1 HR: Performed by: ORTHOPAEDIC SURGERY

## 2020-07-14 PROCEDURE — 77030010509 HC AIRWY LMA MSK TELE -A: Performed by: ANESTHESIOLOGY

## 2020-07-14 PROCEDURE — 74011000250 HC RX REV CODE- 250: Performed by: ORTHOPAEDIC SURGERY

## 2020-07-14 PROCEDURE — 76210000006 HC OR PH I REC 0.5 TO 1 HR: Performed by: ORTHOPAEDIC SURGERY

## 2020-07-14 PROCEDURE — 74011250636 HC RX REV CODE- 250/636: Performed by: ANESTHESIOLOGY

## 2020-07-14 PROCEDURE — 77030040361 HC SLV COMPR DVT MDII -B: Performed by: ORTHOPAEDIC SURGERY

## 2020-07-14 PROCEDURE — 77030008832 HC WRE K ZIMM -B: Performed by: ORTHOPAEDIC SURGERY

## 2020-07-14 PROCEDURE — 76010000160 HC OR TIME 0.5 TO 1 HR INTENSV-TIER 1: Performed by: ORTHOPAEDIC SURGERY

## 2020-07-14 PROCEDURE — 74011250637 HC RX REV CODE- 250/637: Performed by: ANESTHESIOLOGY

## 2020-07-14 PROCEDURE — 77030018836 HC SOL IRR NACL ICUM -A: Performed by: ORTHOPAEDIC SURGERY

## 2020-07-14 PROCEDURE — 77030020754 HC CUF TRNQT 2BLA STRY -B: Performed by: ORTHOPAEDIC SURGERY

## 2020-07-14 DEVICE — SCR BNE LCK SQ DRV 2.7X16MM --: Type: IMPLANTABLE DEVICE | Site: ARM | Status: FUNCTIONAL

## 2020-07-14 DEVICE — IMPLANTABLE DEVICE: Type: IMPLANTABLE DEVICE | Site: ARM | Status: FUNCTIONAL

## 2020-07-14 DEVICE — SCR BNE NLCK LO PROF 2.7X13MM -- DVR CROSSLOCK: Type: IMPLANTABLE DEVICE | Site: ARM | Status: FUNCTIONAL

## 2020-07-14 DEVICE — SCR BNE LCK SQ DRV 2.7X14MM -- DVR CROSSLOCK: Type: IMPLANTABLE DEVICE | Site: ARM | Status: FUNCTIONAL

## 2020-07-14 DEVICE — SCREW BNE FIX L18MM DIA2.2MM DST VOLAR RAD SMOOTH LOK PEG: Type: IMPLANTABLE DEVICE | Site: ARM | Status: FUNCTIONAL

## 2020-07-14 DEVICE — PEG BNE FIX L16MM DIA2.2MM DST VOLAR RAD SMOOTH LOK FOR DVR: Type: IMPLANTABLE DEVICE | Site: ARM | Status: FUNCTIONAL

## 2020-07-14 RX ORDER — SODIUM CHLORIDE 0.9 % (FLUSH) 0.9 %
5-40 SYRINGE (ML) INJECTION AS NEEDED
Status: DISCONTINUED | OUTPATIENT
Start: 2020-07-14 | End: 2020-07-14 | Stop reason: HOSPADM

## 2020-07-14 RX ORDER — SODIUM CHLORIDE, SODIUM LACTATE, POTASSIUM CHLORIDE, CALCIUM CHLORIDE 600; 310; 30; 20 MG/100ML; MG/100ML; MG/100ML; MG/100ML
125 INJECTION, SOLUTION INTRAVENOUS CONTINUOUS
Status: DISCONTINUED | OUTPATIENT
Start: 2020-07-14 | End: 2020-07-14 | Stop reason: HOSPADM

## 2020-07-14 RX ORDER — MIDAZOLAM HYDROCHLORIDE 1 MG/ML
0.5 INJECTION, SOLUTION INTRAMUSCULAR; INTRAVENOUS
Status: DISCONTINUED | OUTPATIENT
Start: 2020-07-14 | End: 2020-07-14 | Stop reason: HOSPADM

## 2020-07-14 RX ORDER — CEFAZOLIN SODIUM/WATER 2 G/20 ML
2 SYRINGE (ML) INTRAVENOUS ONCE
Status: COMPLETED | OUTPATIENT
Start: 2020-07-14 | End: 2020-07-14

## 2020-07-14 RX ORDER — OXYCODONE HYDROCHLORIDE 5 MG/1
5 TABLET ORAL AS NEEDED
Status: DISCONTINUED | OUTPATIENT
Start: 2020-07-14 | End: 2020-07-14 | Stop reason: HOSPADM

## 2020-07-14 RX ORDER — FENTANYL CITRATE 50 UG/ML
50 INJECTION, SOLUTION INTRAMUSCULAR; INTRAVENOUS AS NEEDED
Status: DISCONTINUED | OUTPATIENT
Start: 2020-07-14 | End: 2020-07-14 | Stop reason: HOSPADM

## 2020-07-14 RX ORDER — LIDOCAINE HYDROCHLORIDE 20 MG/ML
INJECTION, SOLUTION EPIDURAL; INFILTRATION; INTRACAUDAL; PERINEURAL AS NEEDED
Status: DISCONTINUED | OUTPATIENT
Start: 2020-07-14 | End: 2020-07-14 | Stop reason: HOSPADM

## 2020-07-14 RX ORDER — DEXAMETHASONE SODIUM PHOSPHATE 4 MG/ML
INJECTION, SOLUTION INTRA-ARTICULAR; INTRALESIONAL; INTRAMUSCULAR; INTRAVENOUS; SOFT TISSUE AS NEEDED
Status: DISCONTINUED | OUTPATIENT
Start: 2020-07-14 | End: 2020-07-14 | Stop reason: HOSPADM

## 2020-07-14 RX ORDER — HYDROMORPHONE HYDROCHLORIDE 1 MG/ML
0.2 INJECTION, SOLUTION INTRAMUSCULAR; INTRAVENOUS; SUBCUTANEOUS
Status: DISCONTINUED | OUTPATIENT
Start: 2020-07-14 | End: 2020-07-14 | Stop reason: HOSPADM

## 2020-07-14 RX ORDER — ACETAMINOPHEN 325 MG/1
650 TABLET ORAL ONCE
Status: DISCONTINUED | OUTPATIENT
Start: 2020-07-14 | End: 2020-07-14 | Stop reason: HOSPADM

## 2020-07-14 RX ORDER — ONDANSETRON 2 MG/ML
4 INJECTION INTRAMUSCULAR; INTRAVENOUS AS NEEDED
Status: DISCONTINUED | OUTPATIENT
Start: 2020-07-14 | End: 2020-07-14 | Stop reason: HOSPADM

## 2020-07-14 RX ORDER — MORPHINE SULFATE 10 MG/ML
2 INJECTION, SOLUTION INTRAMUSCULAR; INTRAVENOUS
Status: DISCONTINUED | OUTPATIENT
Start: 2020-07-14 | End: 2020-07-14 | Stop reason: HOSPADM

## 2020-07-14 RX ORDER — FENTANYL CITRATE 50 UG/ML
25 INJECTION, SOLUTION INTRAMUSCULAR; INTRAVENOUS
Status: COMPLETED | OUTPATIENT
Start: 2020-07-14 | End: 2020-07-14

## 2020-07-14 RX ORDER — SODIUM CHLORIDE 9 MG/ML
25 INJECTION, SOLUTION INTRAVENOUS CONTINUOUS
Status: DISCONTINUED | OUTPATIENT
Start: 2020-07-14 | End: 2020-07-14 | Stop reason: HOSPADM

## 2020-07-14 RX ORDER — ONDANSETRON 2 MG/ML
INJECTION INTRAMUSCULAR; INTRAVENOUS AS NEEDED
Status: DISCONTINUED | OUTPATIENT
Start: 2020-07-14 | End: 2020-07-14 | Stop reason: HOSPADM

## 2020-07-14 RX ORDER — BUPIVACAINE HYDROCHLORIDE 5 MG/ML
INJECTION, SOLUTION EPIDURAL; INTRACAUDAL AS NEEDED
Status: DISCONTINUED | OUTPATIENT
Start: 2020-07-14 | End: 2020-07-14 | Stop reason: HOSPADM

## 2020-07-14 RX ORDER — PROPOFOL 10 MG/ML
INJECTION, EMULSION INTRAVENOUS AS NEEDED
Status: DISCONTINUED | OUTPATIENT
Start: 2020-07-14 | End: 2020-07-14 | Stop reason: HOSPADM

## 2020-07-14 RX ORDER — SODIUM CHLORIDE 0.9 % (FLUSH) 0.9 %
5-40 SYRINGE (ML) INJECTION EVERY 8 HOURS
Status: DISCONTINUED | OUTPATIENT
Start: 2020-07-14 | End: 2020-07-14 | Stop reason: HOSPADM

## 2020-07-14 RX ORDER — DIPHENHYDRAMINE HYDROCHLORIDE 50 MG/ML
12.5 INJECTION, SOLUTION INTRAMUSCULAR; INTRAVENOUS AS NEEDED
Status: DISCONTINUED | OUTPATIENT
Start: 2020-07-14 | End: 2020-07-14 | Stop reason: HOSPADM

## 2020-07-14 RX ORDER — FENTANYL CITRATE 50 UG/ML
INJECTION, SOLUTION INTRAMUSCULAR; INTRAVENOUS AS NEEDED
Status: DISCONTINUED | OUTPATIENT
Start: 2020-07-14 | End: 2020-07-14 | Stop reason: HOSPADM

## 2020-07-14 RX ORDER — MIDAZOLAM HYDROCHLORIDE 1 MG/ML
1 INJECTION, SOLUTION INTRAMUSCULAR; INTRAVENOUS AS NEEDED
Status: DISCONTINUED | OUTPATIENT
Start: 2020-07-14 | End: 2020-07-14 | Stop reason: HOSPADM

## 2020-07-14 RX ORDER — SODIUM CHLORIDE, SODIUM LACTATE, POTASSIUM CHLORIDE, CALCIUM CHLORIDE 600; 310; 30; 20 MG/100ML; MG/100ML; MG/100ML; MG/100ML
25 INJECTION, SOLUTION INTRAVENOUS CONTINUOUS
Status: DISCONTINUED | OUTPATIENT
Start: 2020-07-14 | End: 2020-07-14 | Stop reason: HOSPADM

## 2020-07-14 RX ORDER — LIDOCAINE HYDROCHLORIDE 10 MG/ML
0.1 INJECTION, SOLUTION EPIDURAL; INFILTRATION; INTRACAUDAL; PERINEURAL AS NEEDED
Status: DISCONTINUED | OUTPATIENT
Start: 2020-07-14 | End: 2020-07-14 | Stop reason: HOSPADM

## 2020-07-14 RX ORDER — ACETAMINOPHEN 10 MG/ML
INJECTION, SOLUTION INTRAVENOUS AS NEEDED
Status: DISCONTINUED | OUTPATIENT
Start: 2020-07-14 | End: 2020-07-14 | Stop reason: HOSPADM

## 2020-07-14 RX ORDER — PHENYLEPHRINE HCL IN 0.9% NACL 0.4MG/10ML
SYRINGE (ML) INTRAVENOUS AS NEEDED
Status: DISCONTINUED | OUTPATIENT
Start: 2020-07-14 | End: 2020-07-14 | Stop reason: HOSPADM

## 2020-07-14 RX ADMIN — HYDROMORPHONE HYDROCHLORIDE 0.2 MG: 1 INJECTION, SOLUTION INTRAMUSCULAR; INTRAVENOUS; SUBCUTANEOUS at 10:16

## 2020-07-14 RX ADMIN — HYDROMORPHONE HYDROCHLORIDE 0.2 MG: 1 INJECTION, SOLUTION INTRAMUSCULAR; INTRAVENOUS; SUBCUTANEOUS at 10:26

## 2020-07-14 RX ADMIN — ACETAMINOPHEN 1000 MG: 10 INJECTION, SOLUTION INTRAVENOUS at 08:59

## 2020-07-14 RX ADMIN — FENTANYL CITRATE 25 MCG: 50 INJECTION INTRAMUSCULAR; INTRAVENOUS at 10:07

## 2020-07-14 RX ADMIN — Medication 40 MCG: at 08:48

## 2020-07-14 RX ADMIN — SODIUM CHLORIDE, SODIUM LACTATE, POTASSIUM CHLORIDE, AND CALCIUM CHLORIDE 25 ML/HR: 600; 310; 30; 20 INJECTION, SOLUTION INTRAVENOUS at 08:33

## 2020-07-14 RX ADMIN — FENTANYL CITRATE 25 MCG: 50 INJECTION INTRAMUSCULAR; INTRAVENOUS at 10:15

## 2020-07-14 RX ADMIN — FENTANYL CITRATE 50 MCG: 50 INJECTION, SOLUTION INTRAMUSCULAR; INTRAVENOUS at 09:00

## 2020-07-14 RX ADMIN — MEPERIDINE HYDROCHLORIDE 12.5 MG: 50 INJECTION INTRAMUSCULAR; INTRAVENOUS; SUBCUTANEOUS at 09:06

## 2020-07-14 RX ADMIN — DEXAMETHASONE SODIUM PHOSPHATE 4 MG: 4 INJECTION, SOLUTION INTRAMUSCULAR; INTRAVENOUS at 09:16

## 2020-07-14 RX ADMIN — Medication 2 G: at 08:54

## 2020-07-14 RX ADMIN — LIDOCAINE HYDROCHLORIDE 60 MG: 20 INJECTION, SOLUTION EPIDURAL; INFILTRATION; INTRACAUDAL; PERINEURAL at 08:48

## 2020-07-14 RX ADMIN — FENTANYL CITRATE 50 MCG: 50 INJECTION, SOLUTION INTRAMUSCULAR; INTRAVENOUS at 08:42

## 2020-07-14 RX ADMIN — FENTANYL CITRATE 25 MCG: 50 INJECTION INTRAMUSCULAR; INTRAVENOUS at 09:57

## 2020-07-14 RX ADMIN — OXYCODONE 5 MG: 5 TABLET ORAL at 10:02

## 2020-07-14 RX ADMIN — ONDANSETRON HYDROCHLORIDE 4 MG: 2 INJECTION, SOLUTION INTRAMUSCULAR; INTRAVENOUS at 09:16

## 2020-07-14 RX ADMIN — HYDROMORPHONE HYDROCHLORIDE 0.2 MG: 1 INJECTION, SOLUTION INTRAMUSCULAR; INTRAVENOUS; SUBCUTANEOUS at 10:07

## 2020-07-14 RX ADMIN — PROPOFOL 100 MG: 10 INJECTION, EMULSION INTRAVENOUS at 08:48

## 2020-07-14 RX ADMIN — HYDROMORPHONE HYDROCHLORIDE 0.2 MG: 1 INJECTION, SOLUTION INTRAMUSCULAR; INTRAVENOUS; SUBCUTANEOUS at 10:34

## 2020-07-14 RX ADMIN — Medication 40 MCG: at 08:51

## 2020-07-14 RX ADMIN — HYDROMORPHONE HYDROCHLORIDE 0.2 MG: 1 INJECTION, SOLUTION INTRAMUSCULAR; INTRAVENOUS; SUBCUTANEOUS at 09:57

## 2020-07-14 RX ADMIN — PROPOFOL 100 MG: 10 INJECTION, EMULSION INTRAVENOUS at 08:52

## 2020-07-14 RX ADMIN — FENTANYL CITRATE 25 MCG: 50 INJECTION INTRAMUSCULAR; INTRAVENOUS at 10:02

## 2020-07-14 NOTE — DISCHARGE INSTRUCTIONS
Dr. Mccurdy Mean Upper Extremity Postoperative Instructions      1. Please maintain the dressing and splint placed at surgery until your follow up appointment where it will be removed. Please keep the dressing clean and dry. If you have any questions or problems, please call our office at [phone]. 2. Please elevate the operative extremity to the level of the heart to keep swelling at a minimum. You may get up to move around, but when seated please keep the extremity elevated as much as possible. This will decrease swelling and pain. 3. You were told to be non-weight bearing following surgery. Please do not lift anything heavier than 1 lb with your operative hand. 4. You may ice your Arm/Hand 5 times a day for 20 minutes at a time. 5. You had a block from the Anesthesiologist before surgery. Expect this to wear off around 12-24 hours after you received it. You should start taking your pain medication before the feeling begins to come back into your arm/ hand. 6. A prescription for pain medication is provided. The key to pain control is staying ahead of the pain. For the first 48-72 hours after your surgery, you may want to take your pain medication on a regular schedule. After that, you may only need it on an as needed basis. 7. If you experience any redness, increased pain, increased swelling not relieved by elevation, drainage, fever, or chills, please call the office at [phone], and speak with Amy Kelley or Shameka Negron. Please Follow-up at my office [address], [address] in 2 weeks unless otherwise directed.

## 2020-07-14 NOTE — ROUTINE PROCESS
Patient: Jorja Pallas MRN: [id number]  SSN: [id number]   YOB: 1945  Age: 77 y.o. Sex: female     Patient is status post Procedure(s):  OPEN REDUCTION INTERNAL FIXATION LEFT DISTAL RADIUS FRACTURE. Surgeon(s) and Role:     * Gatito Garland MD - Primary    Local/Dose/Irrigation:  20mL 0.5% marcaine plain given at closing                  Peripheral IV 07/14/20 Anterior;Right Hand (Active)   Site Assessment Clean, dry, & intact 07/14/20 0825   Phlebitis Assessment 0 07/14/20 0825   Infiltration Assessment 0 07/14/20 0825   Dressing Status Clean, dry, & intact 07/14/20 0825   Dressing Type Transparent 07/14/20 0825   Hub Color/Line Status Infusing 07/14/20 0825            Airway - Endotracheal Tube 07/14/20 Oral (Active)                   Dressing/Packing:  Wound Arm Left-Dressing Type: 4 x 4;ABD pad;Cast padding;Elastic bandage; Xeroform (07/14/20 0700)    Splint/Cast:  ]    Other:

## 2020-07-14 NOTE — OP NOTES
PREOPERATIVE DIAGNOSIS: Closed left intraarticular distal radius fracture.     POSTOPERATIVE DIAGNOSIS: Closed left intraarticular distal radius fracture.     PROCEDURES PERFORMED: Open reduction and internal fixation of closed left intraarticular distal radius fracture.     SURGEON: Lenny Rivera. Cathy Lopez MD     ANESTHESIA: General.     ESTIMATED BLOOD LOSS: Minimal.     SPECIMENS REMOVED: None.     COMPLICATIONS: None.     IMPLANTS: DVR Crosslink volar locking distal radius plate.     INDICATIONS FOR PROCEDURE: This is a 77year old female who   fell and sustained a left intraarticular distal radius fracture. She was indicated for operative fixation. We discussed risks, benefits, potential complications and alternatives to surgery, and she gave informed consent to proceed.     DESCRIPTION OF PROCEDURE: The patient was identified in the   preoperative holding area. Informed consent was obtained. The   operative site was marked. She was then transported to the OR and placed   supine on the operating table. All bony prominences were well-padded. A tourniquet was applied to the upper brachium. After induction of   General anesthesia, the left upper extremity was sterilely prepped and   draped in the usual fashion. Surgical time-out was held, and the   operative site was confirmed. Preoperative antibiotics were used. After   Esmarch examination, the tourniquet was elevated to 250 mmHg.      A longitudinal incision was made over the FCR tendon. Sharp   dissection was performed through skin and subcutaneous tissues. The   FCR was retracted ulnarly. The deep fascia was incised. The FPL was   swept off of the distal radius. The brachioradialis was released sharply   off of the radial styloid. The pronator was incised sharply off of the   radial margin of the radius. The fracture was easily exposed. The   fracture was then reduced and held in place with provisional K-wire   fixation.  The volar plate was then applied and held in place   provisionally with multiple K-wires. Fluoroscopy was brought in and   confirmed good reduction of the fracture and good alignment of the   hardware. Multiple locking pegs were placed distally, and multiple   locking and nonlocking screws were placed proximally. K-wires were   removed. The wrist and forearm were taken through a full range of   motion without crepitus.      The wound was thoroughly irrigated. The pronator was closed with a 3-  0 Vicryl and the skin was closed with 3-0 Vicryl and 4-0 nylon. The wound was anesthetized with 20 cc of 0.25 % marcaine without epinephrine. Sterile   dressings were applied. A splint was applied. The tourniquet was let   down and brisk capillary refill returned to the digits. She was awakened   from light sedation and transferred to the PACU in stable condition   without complication.

## 2020-07-14 NOTE — ANESTHESIA POSTPROCEDURE EVALUATION
Post-Anesthesia Evaluation and Assessment    Patient: Bismark Pratt MRN: [id number]  SSN: [id number]    YOB: 1945  Age: 77 y.o. Sex: female       Cardiovascular Function/Vital Signs  Visit Vitals  /78   Pulse 75   Temp 36.4 °C (97.6 °F)   Resp 12   Ht 5' 5\" (1.651 m)   Wt 61 kg (134 lb 7.7 oz)   SpO2 100%   BMI 22.38 kg/m²       Patient is status post Regional anesthesia for Procedure(s):  OPEN REDUCTION INTERNAL FIXATION LEFT DISTAL RADIUS FRACTURE. Nausea/Vomiting: None    Postoperative hydration reviewed and adequate. Pain:  Pain Scale 1: Numeric (0 - 10) (07/14/20 1005)  Pain Intensity 1: 5 (07/14/20 1005)   Managed    Neurological Status:   Neuro (WDL): Exceptions to WDL(pt dx with polyneuropathy) (07/14/20 0836)  Neuro  LUE Motor Response: Numbness;Tingling (07/14/20 0836)  LLE Motor Response: Numbness;Tingling (07/14/20 0836)  RUE Motor Response: Numbness;Tingling (07/14/20 0836)  RLE Motor Response: Numbness;Tingling (07/14/20 0836)   At baseline    Mental Status and Level of Consciousness: Alert and oriented to person, place, and time    Pulmonary Status:   O2 Device: Nasal cannula (07/14/20 0932)   Adequate oxygenation and airway patent    Complications related to anesthesia: None    Post-anesthesia assessment completed. No concerns    Signed By: Miles Dugan MD     July 14, 2020              Procedure(s):  OPEN REDUCTION INTERNAL FIXATION LEFT DISTAL RADIUS FRACTURE. general    <BSHSIANPOST>    INITIAL Post-op Vital signs:   Vitals Value Taken Time   /78 7/14/2020 10:15 AM   Temp 36.4 °C (97.6 °F) 7/14/2020  9:32 AM   Pulse 85 7/14/2020 10:28 AM   Resp 24 7/14/2020 10:28 AM   SpO2 99 % 7/14/2020 10:03 AM   Vitals shown include unvalidated device data.

## 2020-07-14 NOTE — ANESTHESIA PREPROCEDURE EVALUATION
Anesthetic History   No history of anesthetic complications            Review of Systems / Medical History  Patient summary reviewed, nursing notes reviewed and pertinent labs reviewed    Pulmonary    COPD: moderate               Neuro/Psych   Within defined limits           Cardiovascular                  Exercise tolerance: >4 METS     GI/Hepatic/Renal       Hepatitis: type B         Endo/Other      Hypothyroidism: well controlled  Arthritis     Other Findings   Comments: Goiter  Chronic pain           Physical Exam    Airway  Mallampati: II  TM Distance: > 6 cm  Neck ROM: normal range of motion   Mouth opening: Normal     Cardiovascular  Regular rate and rhythm,  S1 and S2 normal,  no murmur, click, rub, or gallop             Dental    Dentition: Caps/crowns     Pulmonary  Breath sounds clear to auscultation               Abdominal  GI exam deferred       Other Findings            Anesthetic Plan    ASA: 3  Anesthesia type: general          Induction: Intravenous  Anesthetic plan and risks discussed with: Patient

## 2020-07-14 NOTE — INTERVAL H&P NOTE
Update History & Physical    The Patient's History and Physical was reviewed with the patient and I examined the patient. There was no change. The surgical site was confirmed by the patient and me. Plan:  The risk, benefits, expected outcome, and alternative to the recommended procedure have been discussed with the patient. Patient understands and wants to proceed with the procedure.     Electronically signed by Kindra Yee MD on 7/14/2020 at 8:16 AM

## 2020-07-14 NOTE — PERIOP NOTES
Called and spoke with family in waiting area. Informed them of start of procedure and patient well being. Surgeon will follow up with family following surgery.

## 2022-10-28 NOTE — LETTER
9/18/19 Patient: Gray Reed YOB: 1945 Date of Visit: 9/18/2019 Kaylee Solis MD 
[address] [place] [address] VIA Facsimile: [phone] Dear Kaylee Solis MD, Thank you for referring Ms. Geofm Fuel to [id number] [place] [address] for evaluation. My notes for this consultation are attached. If you have questions, please do not hesitate to call me. I look forward to following your patient along with you. Sincerely, Dominguez Ford MD 
 
 normal (ped)...

## (undated) DEVICE — SUTURE VCRL SZ 4-0 L27IN ABSRB UD L26MM SH 1/2 CIR J415H

## (undated) DEVICE — SUTURE VCRL SZ 3-0 L54IN ABSRB VLT LIGAPAK REEL NDL J205G

## (undated) DEVICE — BANDAGE,ELASTIC,ESMARK,STERILE,4"X9',LF: Brand: MEDLINE

## (undated) DEVICE — STERILE POLYISOPRENE POWDER-FREE SURGICAL GLOVES: Brand: PROTEXIS

## (undated) DEVICE — PLASMABLADE PS210-030S 3.0S LOCK: Brand: PLASMABLADE™

## (undated) DEVICE — (D)SYR 10ML 1/5ML GRAD NSAF -- PKGING CHANGE USE ITEM 338027

## (undated) DEVICE — INFECTION CONTROL KIT SYS

## (undated) DEVICE — KENDALL SCD EXPRESS SLEEVES, KNEE LENGTH, MEDIUM: Brand: KENDALL SCD

## (undated) DEVICE — SURGICAL PROCEDURE PACK BASIN MAJ SET CUST NO CAUT

## (undated) DEVICE — INSULATED BLADE ELECTRODE: Brand: EDGE

## (undated) DEVICE — SPONGE: SPECIALTY PEANUT XR 100/CS: Brand: MEDICAL ACTION INDUSTRIES

## (undated) DEVICE — 1200 GUARD II KIT W/5MM TUBE W/O VAC TUBE: Brand: GUARDIAN

## (undated) DEVICE — X-RAY SPONGES,16 PLY: Brand: DERMACEA

## (undated) DEVICE — SOLUTION IV 1000ML 0.9% SOD CHL

## (undated) DEVICE — ARGYLE FRAZIER SURGICAL SUCTION INSTRUMENT 10 FR/CH (3.3 MM): Brand: ARGYLE

## (undated) DEVICE — DISPOSABLE TOURNIQUET CUFF SINGLE BLADDER, DUAL PORT AND QUICK CONNECT CONNECTOR: Brand: COLOR CUFF

## (undated) DEVICE — DRAPE,HAND,STERILE: Brand: MEDLINE

## (undated) DEVICE — SUTURE MCRYL SZ 4-0 L18IN ABSRB UD P-3 L13MM 3/8 CIR PRIM Y494G

## (undated) DEVICE — SUTURE PERMAHAND SZ 2-0 L18IN NONABSORBABLE BLK L26MM PS 1588H

## (undated) DEVICE — GARMENT,MEDLINE,DVT,INT,CALF,MED, GEN2: Brand: MEDLINE

## (undated) DEVICE — DRAPE C ARM W41XL59IN MINI ELAS OPN

## (undated) DEVICE — BIT DRL QC 2.2MM NS DISP --

## (undated) DEVICE — SYRINGE IRRIG 60ML SFT PLIABLE BLB EZ TO GRP 1 HND USE W/

## (undated) DEVICE — SUTURE VCRL SZ 3-0 L27IN ABSRB UD L26MM SH 1/2 CIR J416H

## (undated) DEVICE — DRAPE,REIN 53X77,STERILE: Brand: MEDLINE

## (undated) DEVICE — STERILE POLYISOPRENE POWDER-FREE SURGICAL GLOVES WITH EMOLLIENT COATING: Brand: PROTEXIS

## (undated) DEVICE — STRIP,CLOSURE,WOUND,MEDI-STRIP,1/2X4: Brand: MEDLINE

## (undated) DEVICE — PREP SKN CHLRAPRP APL 26ML STR --

## (undated) DEVICE — TOWEL SURG W17XL27IN STD BLU COT NONFENESTRATED PREWASHED

## (undated) DEVICE — REM POLYHESIVE ADULT PATIENT RETURN ELECTRODE: Brand: VALLEYLAB

## (undated) DEVICE — CURVED, SMALL JAW, OPEN SEALER/DIVIDER: Brand: LIGASURE

## (undated) DEVICE — MAGNETIC INSTR DRAPE 20X16: Brand: MEDLINE INDUSTRIES, INC.

## (undated) DEVICE — ASTOUND STANDARD SURGICAL GOWN, XXL: Brand: CONVERTORS

## (undated) DEVICE — DERMABOND SKIN ADH 0.7ML -- DERMABOND ADVANCED 12/BX

## (undated) DEVICE — SOLUTION IRRIG 1000ML H2O STRL BLT

## (undated) DEVICE — HANDLE LT SNAP ON ULT DURABLE LENS FOR TRUMPF ALC DISPOSABLE

## (undated) DEVICE — TRAY PREP DRY W/ PREM GLV 2 APPL 6 SPNG 2 UNDPD 1 OVERWRAP

## (undated) DEVICE — ROCKER SWITCH PENCIL BLADE ELECTRODE, HOLSTER: Brand: EDGE

## (undated) DEVICE — DEVON™ KNEE AND BODY STRAP 60" X 3" (1.5 M X 7.6 CM): Brand: DEVON

## (undated) DEVICE — SPONGE GZ W4XL4IN COT 12 PLY TYP VII WVN C FLD DSGN

## (undated) DEVICE — SUTURE MCRYL SZ 4-0 L27IN ABSRB UD L19MM PS-2 1/2 CIR PRIM Y426H

## (undated) DEVICE — BASIC PACK: Brand: CONVERTORS

## (undated) DEVICE — PADDING CAST 4 INX5 YD STRL

## (undated) DEVICE — TIBURON THYROID SHEET: Brand: CONVERTORS

## (undated) DEVICE — APPLIER CLP LIG SM TI PREM SURGCLP SUPER INTLOK 20 DISP

## (undated) DEVICE — SUTURE MCRYL SZ 3-0 L18IN ABSRB UD L19MM PS-2 3/8 CIR PRIM Y497G

## (undated) DEVICE — Device